# Patient Record
Sex: MALE | Race: WHITE | Employment: OTHER | URBAN - NONMETROPOLITAN AREA
[De-identification: names, ages, dates, MRNs, and addresses within clinical notes are randomized per-mention and may not be internally consistent; named-entity substitution may affect disease eponyms.]

---

## 2017-03-22 ENCOUNTER — OFFICE VISIT (OUTPATIENT)
Dept: CARDIOLOGY | Age: 67
End: 2017-03-22

## 2017-03-22 VITALS
HEART RATE: 56 BPM | WEIGHT: 201 LBS | DIASTOLIC BLOOD PRESSURE: 70 MMHG | BODY MASS INDEX: 28.77 KG/M2 | SYSTOLIC BLOOD PRESSURE: 122 MMHG | HEIGHT: 70 IN

## 2017-03-22 DIAGNOSIS — Z82.49 FAMILY HISTORY OF EARLY CAD: ICD-10-CM

## 2017-03-22 DIAGNOSIS — Z95.5 PRESENCE OF STENT IN LAD CORONARY ARTERY: Primary | ICD-10-CM

## 2017-03-22 PROCEDURE — 3017F COLORECTAL CA SCREEN DOC REV: CPT | Performed by: INTERNAL MEDICINE

## 2017-03-22 PROCEDURE — G8420 CALC BMI NORM PARAMETERS: HCPCS | Performed by: INTERNAL MEDICINE

## 2017-03-22 PROCEDURE — G8484 FLU IMMUNIZE NO ADMIN: HCPCS | Performed by: INTERNAL MEDICINE

## 2017-03-22 PROCEDURE — 4040F PNEUMOC VAC/ADMIN/RCVD: CPT | Performed by: INTERNAL MEDICINE

## 2017-03-22 PROCEDURE — 1036F TOBACCO NON-USER: CPT | Performed by: INTERNAL MEDICINE

## 2017-03-22 PROCEDURE — 99213 OFFICE O/P EST LOW 20 MIN: CPT | Performed by: INTERNAL MEDICINE

## 2017-03-22 PROCEDURE — 1123F ACP DISCUSS/DSCN MKR DOCD: CPT | Performed by: INTERNAL MEDICINE

## 2017-03-22 PROCEDURE — G8427 DOCREV CUR MEDS BY ELIG CLIN: HCPCS | Performed by: INTERNAL MEDICINE

## 2017-03-22 PROCEDURE — G8598 ASA/ANTIPLAT THER USED: HCPCS | Performed by: INTERNAL MEDICINE

## 2017-03-22 RX ORDER — SIMVASTATIN 80 MG
TABLET ORAL
Qty: 90 TABLET | Refills: 2 | Status: SHIPPED | OUTPATIENT
Start: 2017-03-22 | End: 2018-04-09 | Stop reason: SDUPTHER

## 2017-08-07 ENCOUNTER — TELEPHONE (OUTPATIENT)
Dept: UROLOGY | Age: 67
End: 2017-08-07

## 2017-08-07 DIAGNOSIS — R31.29 MICROSCOPIC HEMATURIA: Primary | ICD-10-CM

## 2017-08-10 LAB
APPEARANCE: CLEAR
BACTERIA: NEGATIVE PER HPF
BILIRUBIN: NEGATIVE
COLOR: YELLOW
CYTOLOGY-NON GYN: NORMAL
EPITHELIAL CELLS: 0 PER HPF
GLUCOSE BLD-MCNC: NEGATIVE MG/DL
KETONES, URINE: NEGATIVE
LEUKOCYTE ESTERASE, URINE: NEGATIVE
NITRITE, URINE: NEGATIVE
OCCULT BLOOD,URINE: NEGATIVE
PH: 6.5 (ref 5–9)
PROTEIN, URINE: NEGATIVE
RBC: 0 PER HPF (ref 0–5)
SP GRAVITY MISCELLANEOUS: 1.01 (ref 1–1.03)
UROBILINOGEN, URINE: NORMAL
WBC: 0 PER HPF (ref 0–5)

## 2017-09-13 ENCOUNTER — TELEPHONE (OUTPATIENT)
Dept: ADMINISTRATIVE | Age: 67
End: 2017-09-13

## 2018-04-09 ENCOUNTER — OFFICE VISIT (OUTPATIENT)
Dept: CARDIOLOGY CLINIC | Age: 68
End: 2018-04-09
Payer: MEDICARE

## 2018-04-09 VITALS
SYSTOLIC BLOOD PRESSURE: 134 MMHG | DIASTOLIC BLOOD PRESSURE: 72 MMHG | HEART RATE: 54 BPM | WEIGHT: 199 LBS | BODY MASS INDEX: 28.49 KG/M2 | HEIGHT: 70 IN

## 2018-04-09 DIAGNOSIS — E78.01 FAMILIAL HYPERCHOLESTEROLEMIA: ICD-10-CM

## 2018-04-09 DIAGNOSIS — I25.10 ASHD (ARTERIOSCLEROTIC HEART DISEASE): Primary | ICD-10-CM

## 2018-04-09 DIAGNOSIS — I10 ESSENTIAL HYPERTENSION: ICD-10-CM

## 2018-04-09 PROCEDURE — 93000 ELECTROCARDIOGRAM COMPLETE: CPT | Performed by: NUCLEAR MEDICINE

## 2018-04-09 PROCEDURE — 99213 OFFICE O/P EST LOW 20 MIN: CPT | Performed by: NUCLEAR MEDICINE

## 2018-04-09 RX ORDER — SIMVASTATIN 80 MG
TABLET ORAL
Qty: 90 TABLET | Refills: 2 | Status: SHIPPED | OUTPATIENT
Start: 2018-04-09 | End: 2019-04-08 | Stop reason: SDUPTHER

## 2019-04-08 ENCOUNTER — OFFICE VISIT (OUTPATIENT)
Dept: CARDIOLOGY CLINIC | Age: 69
End: 2019-04-08
Payer: MEDICARE

## 2019-04-08 VITALS
HEIGHT: 70 IN | DIASTOLIC BLOOD PRESSURE: 68 MMHG | SYSTOLIC BLOOD PRESSURE: 126 MMHG | HEART RATE: 55 BPM | WEIGHT: 201 LBS | BODY MASS INDEX: 28.77 KG/M2

## 2019-04-08 DIAGNOSIS — I10 ESSENTIAL HYPERTENSION: ICD-10-CM

## 2019-04-08 DIAGNOSIS — E78.01 FAMILIAL HYPERCHOLESTEROLEMIA: ICD-10-CM

## 2019-04-08 DIAGNOSIS — R06.09 DYSPNEA ON EXERTION: ICD-10-CM

## 2019-04-08 DIAGNOSIS — I25.10 ASHD (ARTERIOSCLEROTIC HEART DISEASE): Primary | ICD-10-CM

## 2019-04-08 PROCEDURE — 99213 OFFICE O/P EST LOW 20 MIN: CPT | Performed by: NUCLEAR MEDICINE

## 2019-04-08 PROCEDURE — 93000 ELECTROCARDIOGRAM COMPLETE: CPT | Performed by: NUCLEAR MEDICINE

## 2019-04-08 RX ORDER — OMEGA-3/DHA/EPA/FISH OIL 60 MG-90MG
CAPSULE ORAL
COMMUNITY
End: 2020-07-27

## 2019-04-08 RX ORDER — SIMVASTATIN 80 MG
TABLET ORAL
Qty: 45 TABLET | Refills: 3 | Status: SHIPPED | OUTPATIENT
Start: 2019-04-08 | End: 2019-06-24 | Stop reason: SDUPTHER

## 2019-04-08 NOTE — PROGRESS NOTES
[address] [facility] CARDIOLOGY  [facility]  [address] [address]  Dept: [phone]  Dept Fax: [phone]  Loc: [phone]    Visit Date: 4/8/2019    Shade Later is a 68 y.o. male who presents todayfor:  Chief Complaint   Patient presents with    Check-Up    Coronary Artery Disease    Hypertension    Hyperlipidemia     Known LAD stent   2013  No chest pain per se  Drives a school bus  Some dyspnea at times  Some fatigue   Higher risk patient  BP is stable  No dizziness  No syncope      HPI:  HPI  Past Medical History:   Diagnosis Date    Arthritis     ASHD (arteriosclerotic heart disease) 1/21/2013    Eructation 8/14/2014    GERD (gastroesophageal reflux disease)     Hyperlipidemia     Splenomegaly 9/15/2014      Past Surgical History:   Procedure Laterality Date    APPENDECTOMY  09/22/2016    Dr. Katie Davenport  07/26/2016    Lamar Riddle  2013    FINGER SURGERY       Family History   Problem Relation Age of Onset    Heart Disease Father 50        MI    Stroke Father     Heart Disease Brother 61        STENTS    Arthritis Sister      Social History     Tobacco Use    Smoking status: Never Smoker    Smokeless tobacco: Never Used   Substance Use Topics    Alcohol use: Yes     Alcohol/week: 0.6 oz     Types: 1 Glasses of wine per week     Comment: socially      Current Outpatient Medications   Medication Sig Dispense Refill    Omega-3 Fatty Acids (FISH OIL) 500 MG CAPS Take by mouth      simvastatin (ZOCOR) 80 MG tablet TAKE 1/2 TABLET BY MOUTH NIGHTLY 90 tablet 2    NITROSTAT 0.4 MG SL tablet DISSOLVE 1 TAB UNDER TONGUE EVERY 5 MINUTES AS NEEDED FOR CHEST PAIN 25 tablet 3    Omeprazole 20 MG TBEC Take 20 mg by mouth 2 times daily (before meals). 30 min. before breakfast  & 60 min. before supper. 180 tablet 3    aspirin 81 MG tablet Take 81 mg by mouth daily. Take 2 tablets daily. No current facility-administered medications for this visit. No Known Allergies  Health Maintenance   Topic Date Due    Hepatitis C screen  1950    DTaP/Tdap/Td vaccine (1 - Tdap) 03/15/1967    Shingles Vaccine (1 of 2) 03/15/1998    Colon cancer screen colonoscopy  03/15/1998    Pneumococcal 65+ years Vaccine (1 of 2 - PCV13) 03/15/2013    Lipid screen  01/07/2018    Flu vaccine (Season Ended) 09/01/2019       Subjective:  Review of Systems  General:   No fever, no chills, No fatigue or weight loss  Pulmonary:    No dyspnea, no wheezing  Cardiac:    Denies recent chest pain,   GI:     No nausea or vomiting, no abdominal pain  Neuro:    No dizziness or light headedness,   Musculoskeletal:  No recent active issues  Extremities:   No edema, good peripheral pulses      Objective:  Physical Exam  /68   Pulse 55   Ht 5' 10\" (1.778 m)   Wt 201 lb (91.2 kg)   BMI 28.84 kg/m²   General:   Well developed, well nourished  Lungs:   Clear to auscultation  Heart:    Normal S1 S2, Slight murmur. no rubs, no gallops  Abdomen:   Soft, non tender, no organomegalies, positive bowel sounds  Extremities:   No edema, no cyanosis, good peripheral pulses  Neurological:   Awake, alert, oriented. No obvious focal deficits  Musculoskelatal:  No obvious deformities    Assessment:      Diagnosis Orders   1. ASHD (arteriosclerotic heart disease)  EKG 12 lead   2. Essential hypertension  EKG 12 lead   3. 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 I reviewed the ECG. No acute findings    Plan:  No follow-ups on file. As above  Higher risk   Non invasive cardiac testing will be ordered to further evaluate for any ischemic or structural heart disease as a cause of the patient symptoms. We will proceed with a Stress Cardiolite test and echo soon.   Continue risk factor modification and medical management  Thank you for allowing me to participate in the care of your patient. Please don't hesitate to contact me regarding any further issues related to the patient care    Orders Placed:  Orders Placed This Encounter   Procedures    EKG 12 lead     Order Specific Question:   Reason for Exam?     Answer: Other       Medications Prescribed:  No orders of the defined types were placed in this encounter. Discussed use, benefit, and side effects of prescribed medications. All patient questions answered. Pt voicedunderstanding. Instructed to continue current medications, diet and exercise. Continue risk factor modification and medical management. Patient agreed with treatment plan. Follow up as directed.     Electronically signedby Usha Francisco MD on 4/8/2019 at 9:47 AM

## 2019-04-23 ENCOUNTER — HOSPITAL ENCOUNTER (OUTPATIENT)
Dept: NON INVASIVE DIAGNOSTICS | Age: 69
Discharge: HOME | End: 2019-04-23
Payer: MEDICARE

## 2019-04-23 ENCOUNTER — TELEPHONE (OUTPATIENT)
Dept: CARDIOLOGY CLINIC | Age: 69
End: 2019-04-23

## 2019-04-23 DIAGNOSIS — I25.10 ASHD (ARTERIOSCLEROTIC HEART DISEASE): ICD-10-CM

## 2019-04-23 DIAGNOSIS — R06.09 DYSPNEA ON EXERTION: ICD-10-CM

## 2019-04-23 LAB
LV EF: 44 %
LV EF: 60 %
LVEF MODALITY: NORMAL
LVEF MODALITY: NORMAL

## 2019-04-23 PROCEDURE — 93017 CV STRESS TEST TRACING ONLY: CPT

## 2019-04-23 PROCEDURE — A9500 TC99M SESTAMIBI: HCPCS | Performed by: NUCLEAR MEDICINE

## 2019-04-23 PROCEDURE — 93306 TTE W/DOPPLER COMPLETE: CPT

## 2019-04-23 PROCEDURE — 2709999900 HC NON-CHARGEABLE SUPPLY

## 2019-04-23 PROCEDURE — 3430000000 HC RX DIAGNOSTIC RADIOPHARMACEUTICAL: Performed by: NUCLEAR MEDICINE

## 2019-04-23 PROCEDURE — 78452 HT MUSCLE IMAGE SPECT MULT: CPT

## 2019-04-23 RX ADMIN — Medication 9.2 MILLICURIE: at 09:45

## 2019-04-23 RX ADMIN — Medication 34 MILLICURIE: at 10:49

## 2019-04-23 NOTE — LETTER
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 If you have any questions or concerns, please don't hesitate to call.     Sincerely,        Ev Hernández MD

## 2019-04-23 NOTE — TELEPHONE ENCOUNTER
Patient had Stress test and echo done today. 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

## 2019-04-24 ENCOUNTER — TELEPHONE (OUTPATIENT)
Dept: CARDIOLOGY CLINIC | Age: 69
End: 2019-04-24

## 2019-06-24 RX ORDER — SIMVASTATIN 80 MG
TABLET ORAL
Qty: 45 TABLET | Refills: 3 | Status: SHIPPED | OUTPATIENT
Start: 2019-06-24 | End: 2019-09-23 | Stop reason: SDUPTHER

## 2019-09-23 RX ORDER — SIMVASTATIN 80 MG
TABLET ORAL
Qty: 45 TABLET | Refills: 3 | Status: SHIPPED | OUTPATIENT
Start: 2019-09-23 | End: 2020-07-27 | Stop reason: SDUPTHER

## 2019-09-23 NOTE — TELEPHONE ENCOUNTER
Rupert Mann called requesting a refill on the following medications:  Requested Prescriptions     Pending Prescriptions Disp Refills    simvastatin (ZOCOR) 80 MG tablet 45 tablet 3     Pharmacy verified: [facility] p[facility]      Date of last visit: 4/8/19  Date of next visit (if applicable): 5/3/5407      90 Day supply

## 2020-07-27 ENCOUNTER — TELEPHONE (OUTPATIENT)
Dept: CARDIOLOGY CLINIC | Age: 70
End: 2020-07-27

## 2020-07-27 ENCOUNTER — OFFICE VISIT (OUTPATIENT)
Dept: CARDIOLOGY CLINIC | Age: 70
End: 2020-07-27
Payer: MEDICARE

## 2020-07-27 VITALS
DIASTOLIC BLOOD PRESSURE: 62 MMHG | HEIGHT: 70 IN | SYSTOLIC BLOOD PRESSURE: 136 MMHG | HEART RATE: 46 BPM | BODY MASS INDEX: 28.46 KG/M2 | WEIGHT: 198.8 LBS

## 2020-07-27 PROCEDURE — 93000 ELECTROCARDIOGRAM COMPLETE: CPT | Performed by: NUCLEAR MEDICINE

## 2020-07-27 PROCEDURE — 99213 OFFICE O/P EST LOW 20 MIN: CPT | Performed by: NUCLEAR MEDICINE

## 2020-07-27 RX ORDER — HYDROCHLOROTHIAZIDE 25 MG/1
25 TABLET ORAL DAILY
Qty: 90 TABLET | Refills: 3 | Status: SHIPPED | OUTPATIENT
Start: 2020-07-27 | End: 2020-07-27 | Stop reason: SDUPTHER

## 2020-07-27 RX ORDER — SIMVASTATIN 40 MG
40 TABLET ORAL NIGHTLY
Qty: 90 TABLET | Refills: 3 | Status: SHIPPED | OUTPATIENT
Start: 2020-07-27 | End: 2020-07-27 | Stop reason: SDUPTHER

## 2020-07-27 RX ORDER — HYDROCHLOROTHIAZIDE 25 MG/1
25 TABLET ORAL DAILY
COMMUNITY
End: 2020-07-27 | Stop reason: SDUPTHER

## 2020-07-27 RX ORDER — NITROGLYCERIN 0.4 MG/1
TABLET SUBLINGUAL
Qty: 25 TABLET | Refills: 3 | Status: CANCELLED | OUTPATIENT
Start: 2020-07-27

## 2020-07-27 RX ORDER — NITROGLYCERIN 0.4 MG/1
TABLET SUBLINGUAL
Qty: 25 TABLET | Refills: 3 | Status: SHIPPED | OUTPATIENT
Start: 2020-07-27 | End: 2020-07-30 | Stop reason: SDUPTHER

## 2020-07-27 RX ORDER — SIMVASTATIN 40 MG
40 TABLET ORAL NIGHTLY
Qty: 90 TABLET | Refills: 3 | Status: SHIPPED | OUTPATIENT
Start: 2020-07-27 | End: 2021-04-19 | Stop reason: SDUPTHER

## 2020-07-27 RX ORDER — HYDROCHLOROTHIAZIDE 25 MG/1
25 TABLET ORAL DAILY
Qty: 90 TABLET | Refills: 3 | Status: SHIPPED | OUTPATIENT
Start: 2020-07-27 | End: 2021-04-19

## 2020-07-27 NOTE — TELEPHONE ENCOUNTER
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 Scripts are in Dr. Dennys Cárdenas box.

## 2020-07-27 NOTE — PROGRESS NOTES
[address] [id number]  Dept: [phone]  Dept Fax: [phone]  Loc: [phone]    Visit Date: 7/27/2020    Zeyad Al is a 65 y.o. male who presents todayfor:  Chief Complaint   Patient presents with    1 Year Follow Up    Coronary Artery Disease    Hypertension    Hyperlipidemia     Known LAD stent done 2013   No chest pain  some changes in breathing  Some more dyspnea than usual   More exertional along with wheezing   No new symptoms  No dizziness  No syncope  Slower HR       HPI:  HPI  Past Medical History:   Diagnosis Date    Arthritis     ASHD (arteriosclerotic heart disease) 1/21/2013    Eructation 8/14/2014    GERD (gastroesophageal reflux disease)     Hyperlipidemia     Splenomegaly 9/15/2014      Past Surgical History:   Procedure Laterality Date    APPENDECTOMY  09/22/2016    Dr. Mcqueen Bud COLONOSCOPY  07/26/2016    Vandana Riddle  2013    FINGER SURGERY       Family History   Problem Relation Age of Onset    Heart Disease Father 50        MI    Stroke Father     Heart Disease Brother 61        STENTS    Arthritis Sister      Social History     Tobacco Use    Smoking status: Never Smoker    Smokeless tobacco: Never Used   Substance Use Topics    Alcohol use: Yes     Alcohol/week: 1.0 standard drinks     Types: 1 Glasses of wine per week     Comment: socially      Current Outpatient Medications   Medication Sig Dispense Refill    simvastatin (ZOCOR) 80 MG tablet 0.5 tab daily (Patient taking differently: Take 40 mg by mouth nightly ) 45 tablet 3    NITROSTAT 0.4 MG SL tablet DISSOLVE 1 TAB UNDER TONGUE EVERY 5 MINUTES AS NEEDED FOR CHEST PAIN 25 tablet 3    Omeprazole 20 MG TBEC Take 20 mg by mouth 2 times daily (before meals). 30 min. before breakfast  & 60 min. before supper.  180 tablet 3    aspirin 81 MG tablet Take 81 mg by mouth daily. Take 2 tablets daily. No current facility-administered medications for this visit. No Known Allergies  Health Maintenance   Topic Date Due    Hepatitis C screen  1950    DTaP/Tdap/Td vaccine (1 - Tdap) 03/15/1967    Shingles Vaccine (1 of 2) 03/15/1998    Colon cancer screen colonoscopy  03/15/1998    Pneumococcal 65+ years Vaccine (1 of 1 - PPSV23) 03/15/2013    Lipid screen  01/07/2014    Annual Wellness Visit (AWV)  06/23/2019    Flu vaccine (1) 09/01/2020    Hepatitis A vaccine  Aged Out    Hepatitis B vaccine  Aged Out    Hib vaccine  Aged Out    Meningococcal (ACWY) vaccine  Aged Out       Subjective:  Review of Systems  General:   No fever, no chills, No fatigue or weight loss  Pulmonary:    No dyspnea, no wheezing  Cardiac:    Denies recent chest pain,   GI:     No nausea or vomiting, no abdominal pain  Neuro:    No dizziness or light headedness,   Musculoskeletal:  No recent active issues  Extremities:   No edema, no obvious claudication       Objective:  Physical Exam  /62   Pulse (!) 46   Ht 5' 10\" (1.778 m)   Wt 198 lb 12.8 oz (90.2 kg)   BMI 28.52 kg/m²   General:   Well developed, well nourished  Lungs:   Clear to auscultation  Heart:    Normal S1 S2, Slight murmur. no rubs, no gallops  Abdomen:   Soft, non tender, no organomegalies, positive bowel sounds  Extremities:   No edema, no cyanosis, good peripheral pulses  Neurological:   Awake, alert, oriented. No obvious focal deficits  Musculoskelatal:  No obvious deformities    Assessment:      Diagnosis Orders   1. SOB (shortness of breath) on exertion  EKG 12 lead   2. ASHD (arteriosclerotic heart disease)  EKG 12 lead   3. Coronary artery disease involving native coronary artery of native heart without angina pectoris     cardiac fair for now   ? ? Asthma or COPD  ECG in office was done today. I reviewed the ECG. No acute findings      Plan:  No follow-ups on file.   Monitor   Consider low dose diuretics Consider more testing   Continue risk factor modification and medical management  Thank you for allowing me to participate in the care of your patient. Please don't hesitate to contact me regarding any further issues related to the patient care    Orders Placed:  Orders Placed This Encounter   Procedures    EKG 12 lead     Order Specific Question:   Reason for Exam?     Answer: Other       Medications Prescribed:  No orders of the defined types were placed in this encounter. Discussed use, benefit, and side effects of prescribed medications. All patient questions answered. Pt voicedunderstanding. Instructed to continue current medications, diet and exercise. Continue risk factor modification and medical management. Patient agreed with treatment plan. Follow up as directed.     Electronically signedby Patsy Richardson MD on 7/27/2020 at 8:44 AM

## 2020-07-27 NOTE — LETTER
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 If you have any questions or concerns, please don't hesitate to call.     Sincerely,        Jignesh Jaimes MD

## 2020-07-28 LAB
ABSOLUTE BASO #: 0 X10E9/L (ref 0–0.2)
ABSOLUTE EOS #: 0.2 X10E9/L (ref 0–0.4)
ABSOLUTE LYMPH #: 2.1 X10E9/L (ref 1–3.5)
ABSOLUTE MONO #: 0.3 X10E9/L (ref 0–0.9)
ABSOLUTE NEUT #: 2.5 X10E9/L (ref 1.5–6.6)
ALBUMIN SERPL-MCNC: 4 G/DL (ref 3.2–5.3)
ALK PHOSPHATASE: 52 U/L (ref 39–130)
ALT SERPL-CCNC: 10 U/L (ref 0–40)
ANION GAP SERPL CALCULATED.3IONS-SCNC: 8 MMOL/L (ref 5–15)
AST SERPL-CCNC: 14 U/L (ref 0–41)
BASOPHILS RELATIVE PERCENT: 0.6 %
BILIRUB SERPL-MCNC: 0.5 MG/DL (ref 0.3–1.2)
BILIRUBIN DIRECT: 0.1 MG/DL (ref 0–0.4)
BUN BLDV-MCNC: 18 MG/DL (ref 5–27)
CALCIUM SERPL-MCNC: 9 MG/DL (ref 8.5–10.5)
CHLORIDE BLD-SCNC: 106 MMOL/L (ref 98–109)
CHOLESTEROL/HDL RATIO: 3.9 (ref 1–5)
CHOLESTEROL: 149 MG/DL (ref 150–200)
CO2: 30 MMOL/L (ref 22–32)
CREAT SERPL-MCNC: 1.15 MG/DL (ref 0.6–1.3)
EGFR AFRICAN AMERICAN: >60 ML/MIN/1.73SQ.M
EGFR IF NONAFRICAN AMERICAN: >60 ML/MIN/1.73SQ.M
EOSINOPHILS RELATIVE PERCENT: 3 %
GLUCOSE: 87 MG/DL (ref 65–99)
HCT VFR BLD CALC: 40.4 % (ref 39–49)
HDLC SERPL-MCNC: 38 MG/DL
HEMOGLOBIN: 13.5 G/DL (ref 13–17)
LDL CHOLESTEROL CALCULATED: 100 MG/DL
LDL/HDL RATIO: 2.6
LYMPHOCYTE %: 41.8 %
MCH RBC QN AUTO: 28.1 PG (ref 27–34)
MCHC RBC AUTO-ENTMCNC: 33.4 G/DL (ref 32–36)
MCV RBC AUTO: 84 FL (ref 80–100)
MONOCYTES # BLD: 5.7 %
NEUTROPHILS RELATIVE PERCENT: 48.9 %
PDW BLD-RTO: 13.8 % (ref 11.5–15)
PLATELETS: 125 X10E9/L (ref 150–450)
PMV BLD AUTO: 8.4 FL (ref 7–12)
POTASSIUM SERPL-SCNC: 4.2 MMOL/L (ref 3.5–5)
RBC: 4.8 X10E12/L (ref 4.1–5.7)
SODIUM BLD-SCNC: 144 MMOL/L (ref 134–146)
TOTAL PROTEIN: 6.5 G/DL (ref 6–8)
TRIGL SERPL-MCNC: 54 MG/DL (ref 27–150)
VLDLC SERPL CALC-MCNC: 11 MG/DL (ref 0–30)
WBC: 5.1 X10E9/L (ref 4–11)

## 2020-07-28 NOTE — TELEPHONE ENCOUNTER
Talked to [facility] and she is aware their daughter in law Sudha Yanes picked up letter. They state they are also wanting last OV, stress and echo reports. Information printed and out at  of office. Sudha Yanes will  today.

## 2020-07-30 RX ORDER — NITROGLYCERIN 0.4 MG/1
TABLET SUBLINGUAL
Qty: 25 TABLET | Refills: 1 | Status: SHIPPED | OUTPATIENT
Start: 2020-07-30 | End: 2022-04-25 | Stop reason: SDUPTHER

## 2021-04-19 ENCOUNTER — OFFICE VISIT (OUTPATIENT)
Dept: CARDIOLOGY CLINIC | Age: 71
End: 2021-04-19
Payer: MEDICARE

## 2021-04-19 VITALS
HEIGHT: 70 IN | WEIGHT: 194 LBS | HEART RATE: 60 BPM | BODY MASS INDEX: 27.77 KG/M2 | SYSTOLIC BLOOD PRESSURE: 131 MMHG | DIASTOLIC BLOOD PRESSURE: 66 MMHG

## 2021-04-19 DIAGNOSIS — I25.10 CORONARY ARTERY DISEASE INVOLVING NATIVE CORONARY ARTERY OF NATIVE HEART WITHOUT ANGINA PECTORIS: Primary | ICD-10-CM

## 2021-04-19 DIAGNOSIS — I10 ESSENTIAL HYPERTENSION: ICD-10-CM

## 2021-04-19 DIAGNOSIS — E78.01 FAMILIAL HYPERCHOLESTEROLEMIA: ICD-10-CM

## 2021-04-19 DIAGNOSIS — Z01.818 PRE-OP EXAM: ICD-10-CM

## 2021-04-19 PROCEDURE — 99214 OFFICE O/P EST MOD 30 MIN: CPT | Performed by: NUCLEAR MEDICINE

## 2021-04-19 PROCEDURE — 93000 ELECTROCARDIOGRAM COMPLETE: CPT | Performed by: NUCLEAR MEDICINE

## 2021-04-19 RX ORDER — SIMVASTATIN 40 MG
40 TABLET ORAL NIGHTLY
Qty: 90 TABLET | Refills: 3 | Status: SHIPPED | OUTPATIENT
Start: 2021-04-19 | End: 2022-04-25 | Stop reason: SDUPTHER

## 2021-04-19 NOTE — PROGRESS NOTES
[facility] [address] [place] [address] [address].  [address]  [facility] [address]  Dept: [phone]  Dept Fax: [phone]  Loc: [phone]    Visit Date: 4/19/2021    Davian Cox is a 66 y.o. male who presents todayfor:  Chief Complaint   Patient presents with    1 Year Follow Up    Coronary Artery Disease    Hypertension    Hyperlipidemia   some chest tightness with exertion in the last several months  Some dyspnea with exertion   No use of nitro   known LAD stent 2013  He is a school bud   Does have HTN off of  medical RX, life style controlled   No dizziness  No syncope  On statins for hyperlipidemia  No issues there       HPI:  HPI  Past Medical History:   Diagnosis Date    Arthritis     ASHD (arteriosclerotic heart disease) 1/21/2013    Eructation 8/14/2014    GERD (gastroesophageal reflux disease)     Hyperlipidemia     Splenomegaly 9/15/2014      Past Surgical History:   Procedure Laterality Date    APPENDECTOMY  09/22/2016    Dr. Maksim Gil  07/26/2016    Yadiel Riddle  2013    FINGER SURGERY       Family History   Problem Relation Age of Onset    Heart Disease Father 50        MI    Stroke Father     Heart Disease Brother 61        STENTS    Arthritis Sister      Social History     Tobacco Use    Smoking status: Never Smoker    Smokeless tobacco: Never Used   Substance Use Topics    Alcohol use: Yes     Alcohol/week: 1.0 standard drinks     Types: 1 Glasses of wine per week     Comment: socially      Current Outpatient Medications   Medication Sig Dispense Refill    nitroGLYCERIN (NITROSTAT) 0.4 MG SL tablet DISSOLVE 1 TAB UNDER TONGUE EVERY 5 MINUTES AS NEEDED FOR CHEST PAIN.  After third dose and still no relief, call 911 25 tablet 1    simvastatin (ZOCOR) 40 MG tablet Take 1 tablet by mouth nightly 90 tablet 3    Omeprazole 20 MG TBEC Take 20 mg by mouth 2 times daily (before meals). 30 min. before breakfast  & 60 min. before supper. 180 tablet 3    aspirin 81 MG tablet Take 81 mg by mouth daily. Take 2 tablets daily. No current facility-administered medications for this visit. No Known Allergies  Health Maintenance   Topic Date Due    Hepatitis C screen  Never done    COVID-19 Vaccine (1) Never done    DTaP/Tdap/Td vaccine (1 - Tdap) Never done    Shingles Vaccine (1 of 2) Never done    Colon cancer screen colonoscopy  Never done    Pneumococcal 65+ years Vaccine (1 of 1 - PPSV23) Never done   [employer] Visit (AWV)  Never done    Lipid screen  07/27/2021    Potassium monitoring  07/27/2021    Creatinine monitoring  07/27/2021    Flu vaccine (Season Ended) 09/01/2021    Hepatitis A vaccine  Aged Out    Hepatitis B vaccine  Aged Out    Hib vaccine  Aged Out    Meningococcal (ACWY) vaccine  Aged Out       Subjective:  Review of Systems  General:   No fever, no chills, some fatigue or weight loss  Pulmonary:    some dyspnea, no wheezing  Cardiac:    Did have  recent chest pain,   GI:     No nausea or vomiting, no abdominal pain  Neuro:    No dizziness or light headedness,   Musculoskeletal:  No recent active issues  Extremities:   No edema, no obvious claudication       Objective:  Physical Exam  /66   Pulse 60   Ht 5' 10\" (1.778 m)   Wt 194 lb (88 kg)   BMI 27.84 kg/m²   General:   Well developed, well nourished  Lungs:   Clear to auscultation  Heart:    Normal S1 S2, Slight murmur. no rubs, no gallops  Abdomen:   Soft, non tender, no organomegalies, positive bowel sounds  Extremities:   No edema, no cyanosis, good peripheral pulses  Neurological:   Awake, alert, oriented. No obvious focal deficits  Musculoskelatal:  No obvious deformities    Assessment:      Diagnosis Orders   1. Coronary artery disease involving native coronary artery of native heart without angina pectoris  EKG 12 Lead   2. Essential hypertension     3.  Familial hypercholesterolemia     as above  Higher risk patient   No recent blood work   ECG in office was done today. I reviewed the ECG. No acute findings      Plan:  No follow-ups on file. As above  Non invasive cardiac testing will be ordered to further evaluate for any ischemic or structural heart disease as a cause of the patient symptoms. We will proceed with a Stress Cardiolite test and echo soon. Blood work   Continue risk factor modification and medical management  Thank you for allowing me to participate in the care of your patient. Please don't hesitate to contact me regarding any further issues related to the patient care    Orders Placed:  Orders Placed This Encounter   Procedures    EKG 12 Lead     Order Specific Question:   Reason for Exam?     Answer: Other       Medications Prescribed:  No orders of the defined types were placed in this encounter. Discussed use, benefit, and side effects of prescribed medications. All patient questions answered. Pt voicedunderstanding. Instructed to continue current medications, diet and exercise. Continue risk factor modification and medical management. Patient agreed with treatment plan. Follow up as directed.     Electronically signedby Zeinab Lu MD on 4/19/2021 at 7:45 AM

## 2021-04-19 NOTE — PROGRESS NOTES
Pt C/O   Pt has no heart related complaints at this time.      Pt denies CP, SOB, Headache, dizziness, heart palpitations, swelling, fatigue

## 2021-04-21 ENCOUNTER — HOSPITAL ENCOUNTER (OUTPATIENT)
Dept: NON INVASIVE DIAGNOSTICS | Age: 71
Discharge: HOME | End: 2021-04-21
Payer: MEDICARE

## 2021-04-21 ENCOUNTER — TELEPHONE (OUTPATIENT)
Dept: CARDIOLOGY CLINIC | Age: 71
End: 2021-04-21

## 2021-04-21 VITALS — WEIGHT: 190 LBS | BODY MASS INDEX: 28.14 KG/M2 | HEIGHT: 69 IN

## 2021-04-21 DIAGNOSIS — E78.01 FAMILIAL HYPERCHOLESTEROLEMIA: ICD-10-CM

## 2021-04-21 DIAGNOSIS — Z01.818 PRE-OP EXAM: ICD-10-CM

## 2021-04-21 DIAGNOSIS — I25.10 CORONARY ARTERY DISEASE INVOLVING NATIVE CORONARY ARTERY OF NATIVE HEART WITHOUT ANGINA PECTORIS: ICD-10-CM

## 2021-04-21 DIAGNOSIS — I10 ESSENTIAL HYPERTENSION: ICD-10-CM

## 2021-04-21 LAB
LV EF: 46 %
LV EF: 55 %
LVEF MODALITY: NORMAL
LVEF MODALITY: NORMAL

## 2021-04-21 PROCEDURE — 93017 CV STRESS TEST TRACING ONLY: CPT | Performed by: NUCLEAR MEDICINE

## 2021-04-21 PROCEDURE — 93306 TTE W/DOPPLER COMPLETE: CPT

## 2021-04-21 PROCEDURE — 78452 HT MUSCLE IMAGE SPECT MULT: CPT

## 2021-04-21 PROCEDURE — 3430000000 HC RX DIAGNOSTIC RADIOPHARMACEUTICAL: Performed by: NUCLEAR MEDICINE

## 2021-04-21 PROCEDURE — A9500 TC99M SESTAMIBI: HCPCS | Performed by: NUCLEAR MEDICINE

## 2021-04-21 RX ADMIN — Medication 32 MILLICURIE: at 11:41

## 2021-04-21 RX ADMIN — Medication 8.6 MILLICURIE: at 10:45

## 2021-04-22 ENCOUNTER — TELEPHONE (OUTPATIENT)
Dept: CARDIOLOGY CLINIC | Age: 71
End: 2021-04-22

## 2021-04-22 DIAGNOSIS — R94.39 ABNORMAL STRESS TEST: Primary | ICD-10-CM

## 2021-04-22 NOTE — TELEPHONE ENCOUNTER
Spoke to pt and his wife, he states he does have some on and off cp, ok to order cath. Gave order to scheduling. Per pt ok to speak to wife.

## 2021-04-23 ENCOUNTER — HOSPITAL ENCOUNTER (OUTPATIENT)
Age: 71
Discharge: HOME | End: 2021-04-23
Payer: MEDICARE

## 2021-04-24 LAB
ABSOLUTE BASO #: 0 X10E9/L (ref 0–0.2)
ABSOLUTE EOS #: 0.1 X10E9/L (ref 0–0.4)
ABSOLUTE LYMPH #: 2.2 X10E9/L (ref 1–3.5)
ABSOLUTE MONO #: 0.2 X10E9/L (ref 0–0.9)
ABSOLUTE NEUT #: 3 X10E9/L (ref 1.5–6.6)
ALBUMIN SERPL-MCNC: 4.5 G/DL (ref 3.2–5.3)
ALK PHOSPHATASE: 60 U/L (ref 39–130)
ALT SERPL-CCNC: 8 U/L (ref 0–40)
ANION GAP SERPL CALCULATED.3IONS-SCNC: 9 MMOL/L (ref 5–15)
AST SERPL-CCNC: 19 U/L (ref 0–41)
BASOPHILS RELATIVE PERCENT: 0.6 %
BILIRUB SERPL-MCNC: 0.5 MG/DL (ref 0.3–1.2)
BILIRUBIN DIRECT: 0.1 MG/DL (ref 0–0.4)
BUN BLDV-MCNC: 22 MG/DL (ref 5–27)
CALCIUM SERPL-MCNC: 9.2 MG/DL (ref 8.5–10.5)
CHLORIDE BLD-SCNC: 101 MMOL/L (ref 98–109)
CHOLESTEROL/HDL RATIO: 3.4 (ref 1–5)
CHOLESTEROL: 148 MG/DL (ref 150–200)
CO2: 32 MMOL/L (ref 22–32)
CREAT SERPL-MCNC: 1.24 MG/DL (ref 0.6–1.3)
EGFR AFRICAN AMERICAN: >60 ML/MIN/1.73SQ.M
EGFR IF NONAFRICAN AMERICAN: 57 ML/MIN/1.73SQ.M
ENDOMYSIAL AB IGG: NORMAL
ENDOMYSIAL IGA ANTIBODY: NEGATIVE
EOSINOPHILS RELATIVE PERCENT: 2.4 %
FOLATE: 9.5 NG/ML
GLIADIN ANTIBODIES IGA: 8 U/ML
GLIADIN ANTIBODIES IGG: <1 U/ML
GLUCOSE: 102 MG/DL (ref 65–99)
HCT VFR BLD CALC: 38.6 % (ref 39–49)
HCT VFR BLD CALC: 39.1 % (ref 39–49)
HDLC SERPL-MCNC: 44 MG/DL
HEMOGLOBIN: 12.7 G/DL (ref 13–17)
HEMOGLOBIN: 12.8 G/DL (ref 13–17)
IGA: 124 MG/DL (ref 68–378)
LDL CHOLESTEROL CALCULATED: 91 MG/DL
LDL/HDL RATIO: 2.1
LYMPHOCYTE %: 39.2 %
Lab: NORMAL
MCH RBC QN AUTO: 24.7 PG (ref 27–34)
MCH RBC QN AUTO: 25 PG (ref 27–34)
MCHC RBC AUTO-ENTMCNC: 32.6 G/DL (ref 32–36)
MCHC RBC AUTO-ENTMCNC: 32.9 G/DL (ref 32–36)
MCV RBC AUTO: 76 FL (ref 80–100)
MCV RBC AUTO: 76 FL (ref 80–100)
MONOCYTES # BLD: 4.4 %
NEUTROPHILS RELATIVE PERCENT: 53.4 %
PDW BLD-RTO: 15.4 % (ref 11.5–15)
PDW BLD-RTO: 15.6 % (ref 11.5–15)
PLATELETS: 139 X10E9/L (ref 150–450)
PLATELETS: 141 X10E9/L (ref 150–450)
PMV BLD AUTO: 8.3 FL (ref 7–12)
PMV BLD AUTO: 8.4 FL (ref 7–12)
POTASSIUM SERPL-SCNC: 4 MMOL/L (ref 3.5–5)
PSA, ULTRASENSITIVE: 1.53 NG/ML (ref 0–4)
RBC: 5.08 X10E12/L (ref 4.1–5.7)
RBC: 5.15 X10E12/L (ref 4.1–5.7)
SODIUM BLD-SCNC: 142 MMOL/L (ref 134–146)
SOLUBLE TRANSFERRIN RECEPT: 6.9 MG/L (ref 2.2–5)
TISSUE TRANSGLUTAMINASE IGA: <1 U/ML
TOTAL PROTEIN: 7.3 G/DL (ref 6–8)
TRIGL SERPL-MCNC: 63 MG/DL (ref 27–150)
TTG, IGG: 1 U/ML
VITAMIN B-12: 367 PG/ML (ref 180–914)
VLDLC SERPL CALC-MCNC: 13 MG/DL (ref 0–30)
WBC: 5.5 X10E9/L (ref 4–11)
WBC: 5.6 X10E9/L (ref 4–11)

## 2021-05-11 ENCOUNTER — PREP FOR PROCEDURE (OUTPATIENT)
Dept: CARDIOLOGY | Age: 71
End: 2021-05-11

## 2021-05-11 RX ORDER — SODIUM CHLORIDE 0.9 % (FLUSH) 0.9 %
5-40 SYRINGE (ML) INJECTION EVERY 12 HOURS SCHEDULED
Status: CANCELLED | OUTPATIENT
Start: 2021-05-11

## 2021-05-11 RX ORDER — SODIUM CHLORIDE 9 MG/ML
25 INJECTION, SOLUTION INTRAVENOUS PRN
Status: CANCELLED | OUTPATIENT
Start: 2021-05-11

## 2021-05-11 RX ORDER — ASPIRIN 325 MG
325 TABLET ORAL ONCE
Status: CANCELLED | OUTPATIENT
Start: 2021-05-11 | End: 2021-05-11

## 2021-05-11 RX ORDER — SODIUM CHLORIDE 9 MG/ML
50 INJECTION, SOLUTION INTRAVENOUS CONTINUOUS
Status: CANCELLED | OUTPATIENT
Start: 2021-05-11

## 2021-05-11 RX ORDER — NITROGLYCERIN 0.4 MG/1
0.4 TABLET SUBLINGUAL EVERY 5 MIN PRN
Status: CANCELLED | OUTPATIENT
Start: 2021-05-11

## 2021-05-11 RX ORDER — DIPHENHYDRAMINE HYDROCHLORIDE 50 MG/ML
50 INJECTION INTRAMUSCULAR; INTRAVENOUS ONCE
Status: CANCELLED | OUTPATIENT
Start: 2021-05-11 | End: 2021-05-11

## 2021-05-11 RX ORDER — SODIUM CHLORIDE 0.9 % (FLUSH) 0.9 %
5-40 SYRINGE (ML) INJECTION PRN
Status: CANCELLED | OUTPATIENT
Start: 2021-05-11

## 2021-05-12 ENCOUNTER — TELEPHONE (OUTPATIENT)
Dept: CARDIOLOGY CLINIC | Age: 71
End: 2021-05-12

## 2021-05-12 NOTE — TELEPHONE ENCOUNTER
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 Deanna Gutierrez he has a physical coming up and would like to have letter to take to his appointment with him. Said he just had cath done to day and everything was good. Please advise.

## 2021-05-13 ENCOUNTER — FOLLOWUP TELEPHONE ENCOUNTER (OUTPATIENT)
Dept: INPATIENT UNIT | Age: 71
End: 2021-05-13

## 2021-06-07 ENCOUNTER — TELEPHONE (OUTPATIENT)
Dept: CARDIOLOGY CLINIC | Age: 71
End: 2021-06-07

## 2021-06-07 NOTE — TELEPHONE ENCOUNTER
I had heart cath 5/12 and was put on the medication isosorb mono ER 30 mg.     I have had headaches every day starting 6-8 hours after taking. I was told headaches would go away after 2 weeks. They have not. Should I give it more time?      1. I havent been told or nor understand the reason this med is needed.      2. Is there an alternative med that may not cause headaches.     Was taking Tylenol ES for headaches. It did nothing for headaches. I have been using motrin and do get some relief but have history of gastric reflux and severe inflammation so dont want to stay on motrin routinely. Next appt with Dr Rochelle Pham is July 14.   thanks  [facility]

## 2021-06-11 ENCOUNTER — OFFICE VISIT (OUTPATIENT)
Dept: CARDIOLOGY CLINIC | Age: 71
End: 2021-06-11
Payer: MEDICARE

## 2021-06-11 VITALS
DIASTOLIC BLOOD PRESSURE: 60 MMHG | SYSTOLIC BLOOD PRESSURE: 118 MMHG | WEIGHT: 193.6 LBS | BODY MASS INDEX: 28.68 KG/M2 | HEART RATE: 46 BPM | HEIGHT: 69 IN

## 2021-06-11 DIAGNOSIS — I25.10 CORONARY ARTERY DISEASE INVOLVING NATIVE CORONARY ARTERY OF NATIVE HEART WITHOUT ANGINA PECTORIS: Primary | ICD-10-CM

## 2021-06-11 DIAGNOSIS — E78.01 FAMILIAL HYPERCHOLESTEROLEMIA: ICD-10-CM

## 2021-06-11 PROCEDURE — 99204 OFFICE O/P NEW MOD 45 MIN: CPT | Performed by: NUCLEAR MEDICINE

## 2021-06-11 NOTE — PROGRESS NOTES
[address] [facility] [place] [facility] [address]  [address] [place] [address]  Dept: [phone]  Dept Fax: [phone]  Loc: [phone]    Visit Date: 6/11/2021    Pérez Duenas is a 66 y.o. male who presents todayfor:  Chief Complaint   Patient presents with    Follow-up     discuss Imdur    Hypertension    Coronary Artery Disease     Chest pain improved  Cath done   Moderate CAD  Patent stent   Feels better  Some headaches from imdur  No dizziness  No syncope      HPI:  HPI  Past Medical History:   Diagnosis Date    Arthritis     ASHD (arteriosclerotic heart disease) 1/21/2013    Eructation 8/14/2014    GERD (gastroesophageal reflux disease)     Hyperlipidemia     Splenomegaly 9/15/2014      Past Surgical History:   Procedure Laterality Date    APPENDECTOMY  09/22/2016    Dr. Wong Mathews COLONOSCOPY  07/26/2016    Eric Riddle   [address] WITH STENT PLACEMENT  2013    2 stents lad    FINGER SURGERY       Family History   Problem Relation Age of Onset    Heart Disease Father 50        MI    Stroke Father     Heart Disease Brother 61        STENTS    Arthritis Sister     Heart Disease Brother     Heart Disease Brother     No Known Problems Brother     No Known Problems Brother     No Known Problems Brother     No Known Problems Sister     No Known Problems Sister     No Known Problems Sister      Social History     Tobacco Use    Smoking status: Never Smoker    Smokeless tobacco: Never Used   Substance Use Topics    Alcohol use:  Yes     Alcohol/week: 1.0 standard drinks     Types: 1 Glasses of wine per week     Comment: socially      Current Outpatient Medications   Medication Sig Dispense Refill    hydroCHLOROthiazide (HYDRODIURIL) 25 MG tablet Take 25 mg by mouth daily      ibuprofen (ADVIL;MOTRIN) 200 MG tablet Take 200 mg by mouth every 6 hours as needed for Pain      isosorbide mononitrate (IMDUR) 30 MG extended release tablet Take 1 tablet by mouth daily 30 tablet 3    simvastatin (ZOCOR) 40 MG tablet Take 1 tablet by mouth nightly 90 tablet 3    nitroGLYCERIN (NITROSTAT) 0.4 MG SL tablet DISSOLVE 1 TAB UNDER TONGUE EVERY 5 MINUTES AS NEEDED FOR CHEST PAIN. After third dose and still no relief, call 911 25 tablet 1    Omeprazole 20 MG TBEC Take 20 mg by mouth 2 times daily (before meals). 30 min. before breakfast  & 60 min. before supper. (Patient taking differently: Take 20 mg by mouth 2 times daily 30 min. before breakfast  & 60 min. before supper.) 180 tablet 3    aspirin 81 MG tablet Take 81 mg by mouth daily. Take 2 tablets daily. No current facility-administered medications for this visit.      No Known Allergies  Health Maintenance   Topic Date Due    Hepatitis C screen  Never done    COVID-19 Vaccine (1) Never done    DTaP/Tdap/Td vaccine (1 - Tdap) Never done    Shingles Vaccine (1 of 2) Never done    Colon cancer screen colonoscopy  Never done    Pneumococcal 65+ years Vaccine (1 of 1 - PPSV23) Never done   [employer] Visit (AWV)  Never done    Flu vaccine (Season Ended) 09/01/2021    Lipid screen  05/12/2022    Potassium monitoring  05/12/2022    Creatinine monitoring  05/12/2022    Hepatitis A vaccine  Aged Out    Hepatitis B vaccine  Aged Out    Hib vaccine  Aged Out    Meningococcal (ACWY) vaccine  Aged Out       Subjective:  Review of Systems  General:   No fever, no chills, No fatigue or weight loss  Pulmonary:    some dyspnea, no wheezing  Cardiac:    Denies recent chest pain,   GI:     No nausea or vomiting, no abdominal pain  Neuro:    No dizziness or light headedness,   Musculoskeletal:  No recent active issues  Extremities:   No edema, no obvious claudication       Objective:  Physical Exam  /60   Pulse (!) 46   Ht 5' 9\" (1.753 m)   Wt 193 lb 9.6 oz (87.8 kg)   BMI 28.59 kg/m²   General:   Well developed, well nourished  Lungs:   Clear to auscultation  Heart: Normal S1 S2, Slight murmur. no rubs, no gallops  Abdomen:   Soft, non tender, no organomegalies, positive bowel sounds  Extremities:   No edema, no cyanosis, good peripheral pulses  Neurological:   Awake, alert, oriented. No obvious focal deficits  Musculoskelatal:  No obvious deformities    Assessment:      Diagnosis Orders   1. Coronary artery disease involving native coronary artery of native heart without angina pectoris     2. Familial hypercholesterolemia     as above  cardiac seems fair     Plan:  No follow-ups on file. As above  Has many question   I have spent minutes face to face with the patient. More than 50% of this time was spent counseling and coordinating care. Will cut imdur in half or stop it   Echo next year   Continue risk factor modification and medical management  Thank you for allowing me to participate in the care of your patient. Please don't hesitate to contact me regarding any further issues related to the patient care    Orders Placed:  No orders of the defined types were placed in this encounter. Medications Prescribed:  No orders of the defined types were placed in this encounter. Discussed use, benefit, and side effects of prescribed medications. All patient questions answered. Pt voicedunderstanding. Instructed to continue current medications, diet and exercise. Continue risk factor modification and medical management. Patient agreed with treatment plan. Follow up as directed.     Electronically signedby rKistan Camacho MD on 6/11/2021 at 9:04 AM

## 2021-06-11 NOTE — PROGRESS NOTES
Patient would like to discuss Imdur side effects specifically headaches--pt would also like to know why he is taking Imdur.

## 2021-09-01 ENCOUNTER — HOSPITAL ENCOUNTER (OUTPATIENT)
Age: 71
Discharge: HOME | End: 2021-09-01
Payer: MEDICARE

## 2021-09-01 ENCOUNTER — NURSE ONLY (OUTPATIENT)
Dept: LAB | Age: 71
End: 2021-09-01

## 2021-09-01 ENCOUNTER — HOSPITAL ENCOUNTER (OUTPATIENT)
Dept: GENERAL RADIOLOGY | Age: 71
Discharge: HOME | End: 2021-09-01
Payer: MEDICARE

## 2021-09-01 DIAGNOSIS — R93.3 ABNORMAL FINDINGS ON EXAMINATION OF GASTROINTESTINAL TRACT: ICD-10-CM

## 2021-09-01 LAB
FOLATE: 10.3 NG/ML (ref 4.8–24.2)
HCT VFR BLD CALC: 37.8 % (ref 42–52)
HEMOGLOBIN: 11.6 GM/DL (ref 14–18)
INR BLD: 1.06 (ref 0.85–1.13)
VITAMIN B-12: 413 PG/ML (ref 211–911)

## 2021-09-01 PROCEDURE — 74018 RADEX ABDOMEN 1 VIEW: CPT

## 2021-09-04 LAB — SOLUBLE TRANSFERRIN RECEPT: 5.9 MG/L (ref 2.2–5)

## 2022-03-01 ENCOUNTER — PATIENT MESSAGE (OUTPATIENT)
Dept: CARDIOLOGY CLINIC | Age: 72
End: 2022-03-01

## 2022-03-01 RX ORDER — HYDROCHLOROTHIAZIDE 25 MG/1
25 TABLET ORAL DAILY
Qty: 90 TABLET | Refills: 3 | Status: SHIPPED | OUTPATIENT
Start: 2022-03-01

## 2022-04-25 ENCOUNTER — OFFICE VISIT (OUTPATIENT)
Dept: CARDIOLOGY CLINIC | Age: 72
End: 2022-04-25
Payer: MEDICARE

## 2022-04-25 VITALS
HEIGHT: 70 IN | WEIGHT: 196 LBS | HEART RATE: 45 BPM | BODY MASS INDEX: 28.06 KG/M2 | DIASTOLIC BLOOD PRESSURE: 72 MMHG | SYSTOLIC BLOOD PRESSURE: 137 MMHG

## 2022-04-25 DIAGNOSIS — I25.10 ASHD (ARTERIOSCLEROTIC HEART DISEASE): Primary | ICD-10-CM

## 2022-04-25 DIAGNOSIS — I20.9 ANGINA PECTORIS, UNSPECIFIED (HCC): ICD-10-CM

## 2022-04-25 DIAGNOSIS — I25.119 ATHEROSCLEROSIS OF NATIVE CORONARY ARTERY OF NATIVE HEART WITH ANGINA PECTORIS (HCC): ICD-10-CM

## 2022-04-25 DIAGNOSIS — I10 PRIMARY HYPERTENSION: ICD-10-CM

## 2022-04-25 DIAGNOSIS — E78.01 FAMILIAL HYPERCHOLESTEROLEMIA: ICD-10-CM

## 2022-04-25 PROCEDURE — 99213 OFFICE O/P EST LOW 20 MIN: CPT | Performed by: NUCLEAR MEDICINE

## 2022-04-25 PROCEDURE — 93000 ELECTROCARDIOGRAM COMPLETE: CPT | Performed by: NUCLEAR MEDICINE

## 2022-04-25 RX ORDER — NITROGLYCERIN 0.4 MG/1
TABLET SUBLINGUAL
Qty: 25 TABLET | Refills: 3 | Status: SHIPPED | OUTPATIENT
Start: 2022-04-25

## 2022-04-25 RX ORDER — SIMVASTATIN 40 MG
40 TABLET ORAL NIGHTLY
Qty: 90 TABLET | Refills: 3 | Status: SHIPPED | OUTPATIENT
Start: 2022-04-25

## 2022-04-25 RX ORDER — OMEPRAZOLE 40 MG/1
40 CAPSULE, DELAYED RELEASE ORAL DAILY
COMMUNITY
Start: 2022-04-24 | End: 2022-08-01 | Stop reason: SDUPTHER

## 2022-07-29 ENCOUNTER — HOSPITAL ENCOUNTER (OUTPATIENT)
Dept: NON INVASIVE DIAGNOSTICS | Age: 72
Discharge: HOME | End: 2022-07-29
Payer: MEDICARE

## 2022-07-29 ENCOUNTER — TELEPHONE (OUTPATIENT)
Dept: CARDIOLOGY CLINIC | Age: 72
End: 2022-07-29

## 2022-07-29 DIAGNOSIS — R00.1 BRADYCARDIA: ICD-10-CM

## 2022-07-29 DIAGNOSIS — R00.1 BRADYCARDIA: Primary | ICD-10-CM

## 2022-07-29 PROCEDURE — 93225 XTRNL ECG REC<48 HRS REC: CPT

## 2022-07-29 PROCEDURE — 93226 XTRNL ECG REC<48 HR SCAN A/R: CPT

## 2022-07-29 NOTE — TELEPHONE ENCOUNTER
I talked to Leonie-wife at length and she has many concerns. HR today is 40 but his baseline in 46 last few visits with Dr. Therese Henry. She states he does have some shortness of breath but denies having any dizziness or passing out. She states he bikes 8 miles per day. I advised her that if HR gets much lower he needs to go to ER. Also advised if patient having dizziness or syncope needs to seek medical attention. Discussed with Ely Yanes and verbal order received for 48 hour holter monitor. Scheduling notified this needs placed today if possible. Please agree Ely Yanes. Patient has an appt on 8/17 with Dr. Therese Henry.

## 2022-07-29 NOTE — TELEPHONE ENCOUNTER
48 hr holter scheduled 07-29-22  Wife informed to send pt to heart center at [facility], they will work him in for 48 hr holter monitor

## 2022-07-29 NOTE — PROCEDURES
48 hour Holter Monitor was applied to patient.  Patient was instructed to remove monitor on 7/31 at 1419 and return to  of hospital. The serial number on the Holter Monitor is [id number]

## 2022-07-29 NOTE — TELEPHONE ENCOUNTER
Patient's spouse called in regards to a mychart message encounter. She was wanting to know when Dr. Kimberly Reyes would be back in the office. She advised me of the same things that are noted in the [facility] message and was concerned about his SOB. She is wanting to know if he can be seen by Kimberly Reyes when he returns to the office. Please contact patient/spouse for scheduling.

## 2022-08-03 LAB
ACQUISITION DURATION: NORMAL S
AVERAGE HEART RATE: 50 BPM
HOOKUP DATE: NORMAL
HOOKUP TIME: NORMAL
MAX HEART RATE TIME/DATE: NORMAL
MAX HEART RATE: 95 BPM
MIN HEART RATE TIME/DATE: NORMAL
MIN HEART RATE: 35 BPM
NUMBER OF QRS COMPLEXES: NORMAL
NUMBER OF SUPRAVENTRICULAR COUPLETS: 0
NUMBER OF SUPRAVENTRICULAR ECTOPICS: 1757
NUMBER OF SUPRAVENTRICULAR ISOLATED BEATS: 1587
NUMBER OF VENTRICULAR BIGEMINAL CYCLES: 0
NUMBER OF VENTRICULAR COUPLETS: 14
NUMBER OF VENTRICULAR ECTOPICS: 918

## 2022-08-17 ENCOUNTER — OFFICE VISIT (OUTPATIENT)
Dept: CARDIOLOGY CLINIC | Age: 72
End: 2022-08-17
Payer: MEDICARE

## 2022-08-17 VITALS
DIASTOLIC BLOOD PRESSURE: 68 MMHG | HEIGHT: 70 IN | HEART RATE: 48 BPM | BODY MASS INDEX: 28.49 KG/M2 | WEIGHT: 199 LBS | SYSTOLIC BLOOD PRESSURE: 132 MMHG

## 2022-08-17 DIAGNOSIS — R00.1 BRADYCARDIA: Primary | ICD-10-CM

## 2022-08-17 DIAGNOSIS — R06.02 SHORTNESS OF BREATH: ICD-10-CM

## 2022-08-17 PROCEDURE — 99214 OFFICE O/P EST MOD 30 MIN: CPT | Performed by: NUCLEAR MEDICINE

## 2022-08-17 PROCEDURE — 1123F ACP DISCUSS/DSCN MKR DOCD: CPT | Performed by: NUCLEAR MEDICINE

## 2022-08-17 NOTE — PROGRESS NOTES
[address] [facility] [address] [address]  [address]  [facility] [address]  Dept: [phone]  Dept Fax: [phone]  Loc: [phone]    Visit Date: 8/17/2022    Doris To is a 74 y.o. male who presents todayfor:  Chief Complaint   Patient presents with    Follow-up    Shortness of Breath   Continues to have low HR  In the 40s  Holter with average Hr 50   No pauses  Some dyspnea  More than usual  Some chest pain  Cath 2021  Mild CAD  Does exercise  Some fatigue  On statins for hyperlipidemia        HPI:  HPI  Past Medical History:   Diagnosis Date    Arthritis     ASHD (arteriosclerotic heart disease) 1/21/2013    Eructation 8/14/2014    GERD (gastroesophageal reflux disease)     Hyperlipidemia     Splenomegaly 9/15/2014      Past Surgical History:   Procedure Laterality Date    APPENDECTOMY  09/22/2016    Dr. Loja Hem  07/26/2016    ,   [address] STENT PLACEMENT  2013    2 stents lad    FINGER SURGERY       Family History   Problem Relation Age of Onset    Heart Disease Father 50        MI    Stroke Father     Heart Disease Brother 61        STENTS    Arthritis Sister     Heart Disease Brother     Heart Disease Brother     No Known Problems Brother     No Known Problems Brother     No Known Problems Brother     No Known Problems Sister     No Known Problems Sister     No Known Problems Sister      Social History     Tobacco Use    Smoking status: Never    Smokeless tobacco: Never   Substance Use Topics    Alcohol use: Yes     Alcohol/week: 1.0 standard drink     Types: 1 Glasses of wine per week     Comment: socially      Current Outpatient Medications   Medication Sig Dispense Refill    omeprazole (PRILOSEC) 40 MG delayed release capsule Take 1 capsule by mouth in the morning.  90 capsule 2    simvastatin (ZOCOR) 40 MG tablet Take 1 tablet by mouth nightly 90 tablet 3    nitroGLYCERIN (NITROSTAT) 0.4 MG SL tablet DISSOLVE 1 TAB UNDER TONGUE EVERY 5 MINUTES AS NEEDED FOR CHEST PAIN. After third dose and still no relief, call 911 25 tablet 3    Multiple Vitamins-Minerals (THERAPEUTIC MULTIVITAMIN-MINERALS) tablet Take 1 tablet by mouth daily      hydroCHLOROthiazide (HYDRODIURIL) 25 MG tablet Take 1 tablet by mouth daily 90 tablet 3    aspirin 81 MG tablet Take 81 mg by mouth daily. Take 2 tablets daily. No current facility-administered medications for this visit. No Known Allergies  Health Maintenance   Topic Date Due    Hepatitis C screen  Never done    COVID-19 Vaccine (4 - Booster for Moderna series) 03/03/2022    Flu vaccine (1) 09/01/2022    Lipids  03/29/2023    Depression Screen  04/11/2023    Pneumococcal 65+ years Vaccine (2 - PCV) 04/11/2023    Annual Wellness Visit (AWV)  04/12/2023    DTaP/Tdap/Td vaccine (3 - Td or Tdap) 10/23/2028    Colorectal Cancer Screen  08/02/2031    Shingles vaccine  Completed    Hepatitis A vaccine  Aged Out    Hepatitis B vaccine  Aged Out    Hib vaccine  Aged Out    Meningococcal (ACWY) vaccine  Aged Out       Subjective:  Review of Systems  General:   No fever, no chills, some fatigue or weight loss  Pulmonary:    some dyspnea, no wheezing  Cardiac:    Denies recent chest pain,   GI:     No nausea or vomiting, no abdominal pain  Neuro:     No dizziness or light headedness,   Musculoskeletal:  No recent active issues  Extremities:   No edema, no obvious claudication     Objective:  Physical Exam  /68   Pulse (!) 48   Ht 5' 10\" (1.778 m)   Wt 199 lb (90.3 kg)   BMI 28.55 kg/m²   General:   Well developed, well nourished  Lungs:    Clear to auscultation  Heart:    Normal S1 S2, Slight murmur. no rubs, no gallops  Abdomen:   Soft, non tender, no organomegalies, positive bowel sounds  Extremities:   No edema, no cyanosis, good peripheral pulses  Neurological:   Awake, alert, oriented.  No obvious focal deficits  Musculoskelatal:  No obvious deformities    Assessment: Diagnosis Orders   1. Bradycardia        2. Shortness of breath        As above  Chronic bradycardia   ? ? The cause of his symptoms  Cath 2021 was with mild CAD    Plan:  No follow-ups on file. Discussed  No clear indication for a pacer  Unless having issues with chronotropic cardiac issues   ? ? Angina   Vs other causes  Consider a stress test to assess HR response   Vs refer to EP for opinion   Vs linq   Continue risk factor modification and medical managementThank you for allowing me to participate in the care of your patient. Please don't hesitate to contact me regarding any further issues related to the patient care    Orders Placed:  No orders of the defined types were placed in this encounter. Medications Prescribed:  No orders of the defined types were placed in this encounter. Discussed use, benefit, and side effects of prescribed medications. All patient questions answered. Pt voicedunderstanding. Instructed to continue current medications, diet and exercise. Continue risk factor modification and medical management. Patient agreed with treatment plan. Follow up as directed.     Electronically signedby Timmy Rainey MD on 8/17/2022 at 10:44 AM

## 2022-08-18 ENCOUNTER — TELEPHONE (OUTPATIENT)
Dept: CARDIOLOGY CLINIC | Age: 72
End: 2022-08-18

## 2022-08-18 NOTE — TELEPHONE ENCOUNTER
Ref from [facility]  30 day event monitor scheduled for 08.25.2022  Appt will need to be scheduled after monitor. Bradycardia  ? ? Angina  Vs other causes  Consider a stress test to assess HR response  Vs refer to EP for opinion  Vs linq   Continue risk factor modification and medical managementThank you for allowing me to participate in the care of your patient.  Please don't hesitate to contact me regarding any further issues related to the patient care

## 2022-08-25 ENCOUNTER — HOSPITAL ENCOUNTER (OUTPATIENT)
Dept: NON INVASIVE DIAGNOSTICS | Age: 72
Discharge: HOME | End: 2022-08-25
Payer: MEDICARE

## 2022-08-25 DIAGNOSIS — R00.1 BRADYCARDIA: ICD-10-CM

## 2022-08-25 DIAGNOSIS — R06.02 SHORTNESS OF BREATH: ICD-10-CM

## 2022-08-25 PROCEDURE — 93270 REMOTE 30 DAY ECG REV/REPORT: CPT

## 2022-08-25 NOTE — PROCEDURES
30 Day Cardiac Event Monitor was applied to patient. Instructions were given and skin/monitor prep and application was demonstrated. Patient was instructed to remove monitor on 8/26 and mail back to [employer].

## 2022-09-28 ENCOUNTER — TELEPHONE (OUTPATIENT)
Dept: CARDIOLOGY CLINIC | Age: 72
End: 2022-09-28

## 2022-09-28 NOTE — PROCEDURES
[address] [place] [address]                                 EVENT MONITOR    PATIENT NAME: Alyse Kidd                  :        1950  MED REC NO:   [id number]                           ROOM:  ACCOUNT NO:   [de-identified]                           ADMIT DATE: 2022  PROVIDER:     Jorge Sullivan M.D.    TEST TYPE:  Event monitor. CLINICAL HISTORY AND INDICATION:  This patient with bradycardia. EVENT MONITOR DESCRIPTION:  Event monitor was attached to the patient  between 2022 to 2022. EVENT MONITOR FINDINGS:  Baseline rhythm showed sinus rhythm with  frequent premature ventricular beats and episodes of ventricular  couplets and triplets as well as ventricular bigeminy occurring on  multiple occasions. Short three to four beats of  nonsustained V-tach was noted, no pauses. CONCLUSION:  1. Sinus rhythm with very heavy burden premature ventricular beats. 2.  Several episodes of ventricular bigeminy and episodes of ventricular  couplets and triplets with short nonsustained ventricular tachycardia. 3.  Abnormal event monitor.         Raul Herrera M.D.    D: 2022 9:47:13       T: 2022 11:35:35     [email or web address]  Job#: [id number]     Doc#: [id number]    CC:

## 2022-09-28 NOTE — TELEPHONE ENCOUNTER
Review abnormal event monitor with frequent PVC's. Patient has a new appointment with Dr. Poppy Lentz on 10-12-22.

## 2022-10-12 ENCOUNTER — TELEPHONE (OUTPATIENT)
Dept: CARDIOLOGY CLINIC | Age: 72
End: 2022-10-12

## 2022-10-12 ENCOUNTER — OFFICE VISIT (OUTPATIENT)
Dept: CARDIOLOGY CLINIC | Age: 72
End: 2022-10-12
Payer: MEDICARE

## 2022-10-12 VITALS
HEIGHT: 70 IN | BODY MASS INDEX: 28.95 KG/M2 | HEART RATE: 44 BPM | DIASTOLIC BLOOD PRESSURE: 67 MMHG | WEIGHT: 202.2 LBS | SYSTOLIC BLOOD PRESSURE: 148 MMHG

## 2022-10-12 DIAGNOSIS — R06.02 SOB (SHORTNESS OF BREATH) ON EXERTION: Primary | ICD-10-CM

## 2022-10-12 DIAGNOSIS — R00.1 BRADYCARDIA: ICD-10-CM

## 2022-10-12 PROCEDURE — 93000 ELECTROCARDIOGRAM COMPLETE: CPT | Performed by: INTERNAL MEDICINE

## 2022-10-12 PROCEDURE — 99204 OFFICE O/P NEW MOD 45 MIN: CPT | Performed by: INTERNAL MEDICINE

## 2022-10-12 PROCEDURE — 1123F ACP DISCUSS/DSCN MKR DOCD: CPT | Performed by: INTERNAL MEDICINE

## 2022-10-12 RX ORDER — ASPIRIN 81 MG/1
81 TABLET ORAL DAILY
COMMUNITY

## 2022-10-12 RX ORDER — IBUPROFEN 200 MG
200 TABLET ORAL EVERY 6 HOURS PRN
COMMUNITY

## 2022-10-12 NOTE — PROGRESS NOTES
[address] [facility]  Dept: [phone]  Cardiac Electrophysiology: Consultation Note  Patient's demographics:  Date:   10/12/2022  Patient name:              Ronal Cheung  YOB: 1950  Sex:    male   MRN:   [id number]    Primary Care Physician:  Marco Burgos MD    Referring Physician:  Andressa Perez MD    Reason for Consultation:  Sinus bradycardia, evaluation for a pacemaker implantation. Clinical Summary:  72/M with history of fatigue, lack of energy and some shortness of breath for the last 6 months. He was evaluated by Dr. Lisa Schwartz and noted to have sinus bradycardia on electrocardiogram.  His echocardiogram and cardiac catheterizations from 2021 were unremarkable. He had an event monitor recently that showed episodes of sinus bradycardia both during day and night and frequent premature ventricular complexes and short runs of NSVT, burden 6%. He was referred here for evaluation of pacemaker implantation. Denies chest pain, orthopnea, proximal nocturnal dyspnea. He does occasionally notice lower extremity swelling which improved following initiation of hydrochlorothiazide. He is otherwise fairly active. Does not smoke and drinks socially. Multiple family members with coronary artery disease. Medical Hx: Sinus bradycardia, non-obstructive CAD, HPL, and splenomegaly. Review of systems:  Constitutional: Negative for chills and fever  HENT: Negative for congestion, sinus pressure, sneezing and sore throat. Eyes: Negative for pain, discharge, redness and itching. Respiratory: Negative for apnea, cough  Gastrointestinal: Negative for blood in stool, constipation, diarrhea   Endocrine: Negative for cold intolerance, heat intolerance, polydipsia. Genitourinary: Negative for dysuria, enuresis, flank pain and hematuria. Musculoskeletal: Negative for arthralgias, joint swelling and neck pain. Neurological: Negative for numbness and headaches. Psychiatric/Behavioral: Negative for agitation, confusion, decreased concentration and dysphoric mood. Past Medical History[de-identified]  Past Medical History:   Diagnosis Date    Arthritis     ASHD (arteriosclerotic heart disease) 1/21/2013    Eructation 8/14/2014    GERD (gastroesophageal reflux disease)     Hyperlipidemia     Splenomegaly 9/15/2014       Past Surgical History:  Past Surgical History:   Procedure Laterality Date    APPENDECTOMY  09/22/2016    Dr. Coleman Ringwood    COLONOSCOPY  07/26/2016    , [address] [facility] [address],[facility] [address]  2013    2 stents lad    FINGER SURGERY         Family History:  Family History   Problem Relation Age of Onset    Heart Disease Father 50        MI    Stroke Father     Heart Disease Brother 61        STENTS    Arthritis Sister     Heart Disease Brother     Heart Disease Brother     No Known Problems Brother     No Known Problems Brother     No Known Problems Brother     No Known Problems Sister     No Known Problems Sister     No Known Problems Sister         Social History:  Social History     Socioeconomic History    Marital status:    Occupational History    Occupation:      Comment: school bus sub   Tobacco Use    Smoking status: Never    Smokeless tobacco: Never   Vaping Use    Vaping Use: Never used   Substance and Sexual Activity    Alcohol use: Yes     Alcohol/week: 1.0 standard drink     Types: 1 Glasses of wine per week     Comment: socially    Drug use: No     Social Determinants of Health     Physical Activity: Sufficiently Active    Days of Exercise per Week: 5 days    Minutes of Exercise per Session: 40 min        Allergies:  No Known Allergies     Medications:  Current Outpatient Medications   Medication Sig Dispense Refill    omeprazole (PRILOSEC) 40 MG delayed release capsule Take 1 capsule by mouth in the morning.  90 capsule 2    simvastatin (ZOCOR) 40 MG tablet Take 1 tablet by mouth nightly 90 tablet 3    nitroGLYCERIN (NITROSTAT) 0.4 MG SL tablet DISSOLVE 1 TAB UNDER TONGUE EVERY 5 MINUTES AS NEEDED FOR CHEST PAIN. After third dose and still no relief, call 911 25 tablet 3    Multiple Vitamins-Minerals (THERAPEUTIC MULTIVITAMIN-MINERALS) tablet Take 1 tablet by mouth daily      hydroCHLOROthiazide (HYDRODIURIL) 25 MG tablet Take 1 tablet by mouth daily 90 tablet 3    aspirin 81 MG tablet Take 81 mg by mouth daily. Take 2 tablets daily. No current facility-administered medications for this visit. Physical Examination:  Vitals:    10/12/22 0909   BP: (!) 148/67   Pulse: (!) 44   Body mass index is 29.01 kg/m². GENERAL: Alert and oriented. No distress. EYES: No pallor or icterus. ENT: No cyanosis. No thyromegaly or cervical LAP. VESSELS: No jugular venous distension or carotid bruits. HEART: Normal S1/S2. No murmur, rub or gallop. LUNGS: Clear to auscultation. ABDOMEN: Soft and non-tender. EXTREMITIES: No lower extremity edema. Feet are warm. NEUROLOGICAL: Grossly normal.     Laboratory And Diagnostic Data  I have personally reviewed and interpreted the results of the following diagnostic testing    Lab Results   Component Value Date    WBC 4.4 03/29/2022    HGB 12.8 (L) 03/29/2022    HCT 40.1 03/29/2022     (L) 03/29/2022    CHOL 158 03/29/2022    TRIG 73 03/29/2022    HDL 41 03/29/2022    ALT 12 03/29/2022    AST 18 03/29/2022     03/29/2022    K 4.2 03/29/2022     03/29/2022    CREATININE 1.16 03/29/2022    BUN 16 03/29/2022    CO2 32 03/29/2022    INR 1.06 09/01/2021     Echocardiogram 4/21/2022: LVEF 55%, LVWT 0.9 cm, LAD/MICKI 58. No significant valvular abnormalities. ECG Sinus bradycardia (44 BPM) other wise normal.   Coronary angiogram 5/12/2021: Non-obstructive CAD. Event Monitor: Sinus rhythm. Average heart rate 44,PVC/NSVT (burden 6%) and PACs with short runs of atrial tachycardia wit aberrancy. Lowest HR 33 BPM during sleep. Impression:  Symptomatic sinus bradycardia. Premature atrial complexes, burden 6%. Preserved LVEF. Nonoperative coronary artery disease. Hyperlipidemia. Assessment And Recommendations: The patient does not have any reversible cause to explain his sinus bradycardia. He has been symptomatic for at least 6 months. He has preserved LVEF and no significant coronary artery disease on coronary angiogram.  He also has indication for beta-blockers for management of his major ventricular complexes. I think he will be benefited by a dual-chamber pacemaker implantation. Discussed my diagnosis and management options. He wishes to proceed. Risk and benefit discussed. Questions answered    Thank you for allowing me to participate in the care of your patient. Please call me if you have any questions. **This report has been created using voice recognition software. It may contain minor errors which are inherent in voice recognition technology. **       Behzad Marquez MD, MRCP, [facility], [facility] on 10/12/2022 at 9:34 AM

## 2022-10-12 NOTE — TELEPHONE ENCOUNTER
Procedure: new dual pace  Date: 10.21.2022  Arrival Time: 9am  Meds to Hold: none      Instructions verbalized to the patient. Instructions mailed to the patient.

## 2022-10-20 ENCOUNTER — PREP FOR PROCEDURE (OUTPATIENT)
Dept: CARDIOLOGY | Age: 72
End: 2022-10-20

## 2022-10-20 RX ORDER — SODIUM CHLORIDE 9 MG/ML
INJECTION, SOLUTION INTRAVENOUS PRN
Status: CANCELLED | OUTPATIENT
Start: 2022-10-20

## 2022-10-20 RX ORDER — SODIUM CHLORIDE 0.9 % (FLUSH) 0.9 %
5-40 SYRINGE (ML) INJECTION PRN
Status: CANCELLED | OUTPATIENT
Start: 2022-10-20

## 2022-10-20 RX ORDER — SODIUM CHLORIDE 0.9 % (FLUSH) 0.9 %
5-40 SYRINGE (ML) INJECTION EVERY 12 HOURS SCHEDULED
Status: CANCELLED | OUTPATIENT
Start: 2022-10-20

## 2022-10-21 ENCOUNTER — APPOINTMENT (OUTPATIENT)
Dept: GENERAL RADIOLOGY | Age: 72
End: 2022-10-21
Attending: INTERNAL MEDICINE
Payer: MEDICARE

## 2022-10-21 ENCOUNTER — HOSPITAL ENCOUNTER (OUTPATIENT)
Dept: INPATIENT UNIT | Age: 72
Discharge: HOME | End: 2022-10-21
Attending: INTERNAL MEDICINE | Admitting: INTERNAL MEDICINE
Payer: MEDICARE

## 2022-10-21 VITALS
HEART RATE: 64 BPM | TEMPERATURE: 97.7 F | WEIGHT: 200 LBS | HEIGHT: 70 IN | DIASTOLIC BLOOD PRESSURE: 74 MMHG | BODY MASS INDEX: 28.63 KG/M2 | OXYGEN SATURATION: 97 % | RESPIRATION RATE: 16 BRPM | SYSTOLIC BLOOD PRESSURE: 130 MMHG

## 2022-10-21 LAB
ANION GAP SERPL CALCULATED.3IONS-SCNC: 9 MEQ/L (ref 8–16)
APTT: 30.6 SECONDS (ref 22–38)
BUN BLDV-MCNC: 21 MG/DL (ref 7–22)
CALCIUM SERPL-MCNC: 9.3 MG/DL (ref 8.5–10.5)
CHLORIDE BLD-SCNC: 104 MEQ/L (ref 98–111)
CO2: 30 MEQ/L (ref 23–33)
CREAT SERPL-MCNC: 1.1 MG/DL (ref 0.4–1.2)
EKG ATRIAL RATE: 60 BPM
EKG ATRIAL RATE: 62 BPM
EKG P AXIS: 68 DEGREES
EKG P AXIS: 69 DEGREES
EKG P-R INTERVAL: 176 MS
EKG P-R INTERVAL: 178 MS
EKG Q-T INTERVAL: 430 MS
EKG Q-T INTERVAL: 432 MS
EKG QRS DURATION: 134 MS
EKG QRS DURATION: 138 MS
EKG QTC CALCULATION (BAZETT): 432 MS
EKG QTC CALCULATION (BAZETT): 436 MS
EKG R AXIS: 101 DEGREES
EKG R AXIS: 107 DEGREES
EKG T AXIS: -33 DEGREES
EKG T AXIS: -34 DEGREES
EKG VENTRICULAR RATE: 60 BPM
EKG VENTRICULAR RATE: 62 BPM
ERYTHROCYTE [DISTWIDTH] IN BLOOD BY AUTOMATED COUNT: 14.6 % (ref 11.5–14.5)
ERYTHROCYTE [DISTWIDTH] IN BLOOD BY AUTOMATED COUNT: 44.3 FL (ref 35–45)
GFR SERPL CREATININE-BSD FRML MDRD: > 60 ML/MIN/1.73M2
GLUCOSE BLD-MCNC: 96 MG/DL (ref 70–108)
HCT VFR BLD CALC: 41.7 % (ref 42–52)
HEMOGLOBIN: 13.5 GM/DL (ref 14–18)
INR BLD: 1.05 (ref 0.85–1.13)
MCH RBC QN AUTO: 27.3 PG (ref 26–33)
MCHC RBC AUTO-ENTMCNC: 32.4 GM/DL (ref 32.2–35.5)
MCV RBC AUTO: 84.2 FL (ref 80–94)
PLATELET # BLD: 143 THOU/MM3 (ref 130–400)
PMV BLD AUTO: 9.4 FL (ref 9.4–12.4)
POTASSIUM REFLEX MAGNESIUM: 3.8 MEQ/L (ref 3.5–5.2)
RBC # BLD: 4.95 MILL/MM3 (ref 4.7–6.1)
SODIUM BLD-SCNC: 143 MEQ/L (ref 135–145)
WBC # BLD: 5.7 THOU/MM3 (ref 4.8–10.8)

## 2022-10-21 PROCEDURE — 93010 ELECTROCARDIOGRAM REPORT: CPT | Performed by: INTERNAL MEDICINE

## 2022-10-21 PROCEDURE — 36415 COLL VENOUS BLD VENIPUNCTURE: CPT

## 2022-10-21 PROCEDURE — 85027 COMPLETE CBC AUTOMATED: CPT

## 2022-10-21 PROCEDURE — 80048 BASIC METABOLIC PNL TOTAL CA: CPT

## 2022-10-21 PROCEDURE — 6360000002 HC RX W HCPCS: Performed by: NURSE PRACTITIONER

## 2022-10-21 PROCEDURE — 85730 THROMBOPLASTIN TIME PARTIAL: CPT

## 2022-10-21 PROCEDURE — 6360000002 HC RX W HCPCS

## 2022-10-21 PROCEDURE — 2580000003 HC RX 258: Performed by: NURSE PRACTITIONER

## 2022-10-21 PROCEDURE — 33208 INSRT HEART PM ATRIAL & VENT: CPT

## 2022-10-21 PROCEDURE — 85610 PROTHROMBIN TIME: CPT

## 2022-10-21 PROCEDURE — 2580000003 HC RX 258: Performed by: INTERNAL MEDICINE

## 2022-10-21 PROCEDURE — C1898 LEAD, PMKR, OTHER THAN TRANS: HCPCS

## 2022-10-21 PROCEDURE — 6360000002 HC RX W HCPCS: Performed by: INTERNAL MEDICINE

## 2022-10-21 PROCEDURE — 71046 X-RAY EXAM CHEST 2 VIEWS: CPT

## 2022-10-21 PROCEDURE — C1785 PMKR, DUAL, RATE-RESP: HCPCS

## 2022-10-21 PROCEDURE — 2500000003 HC RX 250 WO HCPCS

## 2022-10-21 PROCEDURE — A4217 STERILE WATER/SALINE, 500 ML: HCPCS | Performed by: NURSE PRACTITIONER

## 2022-10-21 PROCEDURE — 33208 INSRT HEART PM ATRIAL & VENT: CPT | Performed by: INTERNAL MEDICINE

## 2022-10-21 PROCEDURE — 93005 ELECTROCARDIOGRAM TRACING: CPT | Performed by: INTERNAL MEDICINE

## 2022-10-21 PROCEDURE — C1894 INTRO/SHEATH, NON-LASER: HCPCS

## 2022-10-21 RX ORDER — SODIUM CHLORIDE 9 MG/ML
INJECTION, SOLUTION INTRAVENOUS PRN
Status: DISCONTINUED | OUTPATIENT
Start: 2022-10-21 | End: 2022-10-21 | Stop reason: HOSPADM

## 2022-10-21 RX ORDER — SODIUM CHLORIDE 0.9 % (FLUSH) 0.9 %
5-40 SYRINGE (ML) INJECTION PRN
Status: DISCONTINUED | OUTPATIENT
Start: 2022-10-21 | End: 2022-10-21 | Stop reason: HOSPADM

## 2022-10-21 RX ORDER — SODIUM CHLORIDE 9 MG/ML
INJECTION, SOLUTION INTRAVENOUS CONTINUOUS
Status: DISCONTINUED | OUTPATIENT
Start: 2022-10-21 | End: 2022-10-21 | Stop reason: HOSPADM

## 2022-10-21 RX ORDER — CEFAZOLIN SODIUM 1 G/50ML
1000 INJECTION, SOLUTION INTRAVENOUS
Status: DISCONTINUED | OUTPATIENT
Start: 2022-10-21 | End: 2022-10-21 | Stop reason: ALTCHOICE

## 2022-10-21 RX ORDER — SODIUM CHLORIDE 0.9 % (FLUSH) 0.9 %
5-40 SYRINGE (ML) INJECTION EVERY 12 HOURS SCHEDULED
Status: DISCONTINUED | OUTPATIENT
Start: 2022-10-21 | End: 2022-10-21 | Stop reason: HOSPADM

## 2022-10-21 RX ADMIN — SODIUM CHLORIDE: 9 INJECTION, SOLUTION INTRAVENOUS at 09:51

## 2022-10-21 RX ADMIN — VANCOMYCIN HYDROCHLORIDE 250 MG: 1 INJECTION, POWDER, LYOPHILIZED, FOR SOLUTION INTRAVENOUS at 11:43

## 2022-10-21 RX ADMIN — CEFAZOLIN 2000 MG: 10 INJECTION, POWDER, FOR SOLUTION INTRAVENOUS at 09:51

## 2022-10-21 NOTE — BRIEF OP NOTE
Brief Postoperative Note    Date:   10/21/2022  Patient name: Zac Agudelo  YOB: 1950  Sex: male   MRN:   [id number]    PCP: Camila Holland MD     Procedure: Dual Chamber pacemaker implantation    Pre-Op Diagnosis: Sick sinus syndrome    Post-Op Diagnosis: Same    Surgeon: Mari Garvey MD, MRCP, [address], [address] [place] [address] Drive    Assistant: Charli Parada. Anesthesia/sedation:  Local lidocaine/fentanyl and midazolam as needed. Estimated Blood Loss (mL): Minimal    Complications: None    Recommendations:  See Epic orders. Bed rest for 2 hours. Keep pocket site dry. No shower for 7 days ((sponge bath and tub bath OK). ICE packs locally. Monitor site for bleeding or swelling. Pressure dressing x 24 hours. Chest x-ray PA and lateral views. Follow up in device clinic in 1 week.        Electronically signed by Mylene Holland MD, Shannen Rosario on 10/21/2022 at 12:22 PM

## 2022-10-21 NOTE — PROCEDURES
[address] [facility]  Dept: [phone]  ELECTROPHYSIOLOGY PROCEDURE NOTE  Patients Demographics:  Date:   10/21/2022  Patient name:              Flores [facility]  YOB: 1950  Sex: male   MRN:   [id number]    Primary Care Provider:    Devon Hamman, MD     Procedure Planned:  Dual chamber pacemaker implantation. Cardiologist:  Bhumika Cortes MD     Indications for Procedure  Sick sinus syndrome. Brief Medical History:  72/M with symptomatic sinus bradycardia electively presents for a dual chamber pacemaker implantation. Preserved LVEF. Medical Hx: Sinus bradycardia, non-obstructive CAD, HPL, and splenomegaly. Procedure Performed:  Dual chamber pacemaker implantation (Deep septal). Ultrasound guided left axillary vein accesses. Fluoroscopy of the leads. Procedure Details: The patient was brought to the electrophysiology lab in a fasting and non-sedated state. He was prepped and draped in the usual sterile fashion. Intravenous Fentanyl and Midazolam as needed was used for conscious sedation. The left pectoral region was liberally infiltrated with 2% lidocaine for local anesthesia. Left axillary vein was cannulated x2 under ultrasound guidance using 21G micro-puncture needle and 2 standard J-tip 0.035 inch guidewires were advanced into IVC. A 4 cm long incision was made in the left pectoral region using #10 blade and a subcutaneous pocket was made. Two 7-French hemostatic peel away sheaths were advanced into the axillary vein over the guide wires. Through one of the sheaths right ventricular pacemaker lead was inserted and advanced under fluoro guidance and positioned into the right ventricular mid septum. After confirming right bundle capture with 5V pacing,, the helix was deployed  into deep septum. The paced QRS complex at this site was narrow (100 ms).  The right atrial lead was then advanced through another sheath and positioned into the right atrial appendage. After deployment of helix the electric parameters of the leads were checked using external analyzing system and noted to be adequate. High output pacing with 10 volts was performed with both the leads and there was no phrenic nerve capture. The leads were secured to the pectoral fascia using 0 Ethibond over the suture sleeves. The pocket was thoroughly irrigated with antibiotic solution. The leads were attached to the pulse generator and lead parameters were tested one more time. The leads were wrapped behind the device and the device was placed inside the pocket. The pocket was closed in 3 layers using, 2-0, 3-0 and 4-0 Vicryl sutures. The incision was covered by steri-strips and site covered with by a safeguard cool pressure dressing. The fluoroscopy of the chest showed no pneumothorax or pericardial or pleural effusion. The patient's hemodynamics were monitored throughout the procedure. He tolerated the procedure well. Medications:  Midazolam 2 mg intravenously. Fentanyl 75 mcg intravenously. Cefazolin 2 gm intravenously for surgical prophylaxis. Lidocaine 2%, 20 cc for local anaesthesia. Fluoroscopy Time:  4.3 minutes. Blood Loss:  Minimal.     Complication:  None. Implanted Device:  Pulse generator: Medtronic, model I6645118 and serial [id number]. Right atrial lead:  Medtronic, model [id number] and serial [id number]. Right ventricular lead: Medtronic, model [id number] and serial [id number]. Intraoperative Electric Parameters:  RA lead: [email or web address] RV lead: [email or web address]    Bradycardia Settings:  MVP  pulses per minute. Summary:  Successful dual chamber pacemaker implantation (deep septal). Fluoroscopy of the leads. Normal pacemaker functions. Recommendations   Observation for 2 hours. Chest x-ray (2 views) and device check in 2 hours.   Discharge and post operative care per protocol.        Electronically signed by Hayes Ortega MD, Patricia Rosas on 10/21/2022 at 12:27 PM

## 2022-10-21 NOTE — PROGRESS NOTES
Gerhardt Pu underwent uneventful dual chamber pacemaker implantation. He was seen and examined post operatively. Cheat x-ray and device interrogation reviewed. The device pocket site has a pressure dressing on. No major complaints. Hemodynamically stable, tolerating food and ambulating. No acute concerns. Postoperative care and follow up discussed. Contact information provided. Can be safely discharged home. Patient in agreement. Discussed with nursing staff.        Shahnaz Yip MD, JOSEYP, Estelita Haywood on 10/21/2022 at 5:15 PM

## 2022-10-21 NOTE — H&P
[facility] ()  [id number] [place] [id number]  H&P and SEDATION/ANALGESIA     Patient demographics:  Date:   10/21/2022  Patient name: Sherin Tobias  YOB: 1950  Sex: male   MRN:   [id number]    Reason for admission or planned procedure:  Dual Chamber pacemaker implantation    Code Status: Full Code    Consent:The risk and benefits of dual chamber pacemaker implantation including pneumothorax, hemothorax, bleeding, vascular complications, infection, pericardial tamponade requiring emergency pericardiocentesis, MI, death, exposure to radiation, lead dislodgement requiring reposition, future operations for generator change or device revision or upgrade were discussed with the patient. He verbalized understanding of the discussion. His questions were answered. The patient wishes to proceed. Brief clinical summary:  72/M with symptomatic sinus bradycardia electively presents for a dual chamber pacemaker implantation. Preserved LVEF. Medical Hx: Sinus bradycardia, non-obstructive CAD, HPL, and splenomegaly. Past Medical History:  Past Medical History:   Diagnosis Date    Arthritis     ASHD (arteriosclerotic heart disease) 01/21/2013    Eructation 08/14/2014    GERD (gastroesophageal reflux disease)     Hyperlipidemia     Sepsis due to urinary tract infection ([address] 02/25/2022    Splenomegaly 09/15/2014       Past Surgical History:  Past Surgical History:   Procedure Laterality Date    APPENDECTOMY  09/22/2016    Dr. Rivera Mo    COLONOSCOPY  07/26/2016    Kristen Riddle  2013    2 stents lad    FINGER SURGERY          Allergies:    Allergies as of 10/21/2022    (No Known Allergies)     Additional information:       Medications     Current Facility-Administered Medications:     sodium chloride flush 0.9 % injection 5-40 mL, 5-40 mL, IntraVENous, 2 times per day, KEL Garcia CNP    sodium chloride flush 0.9 % injection 5-40 mL, 5-40 mL, IntraVENous, PRN, Manfred Acuna APRN - CNP    0.9 % sodium chloride infusion, , IntraVENous, PRN, KEL Jones CNP    ceFAZolin (ANCEF) 1000 mg in dextrose 5 % 50 mL IVPB (premix), 1,000 mg, IntraVENous, On Call to OR, KEL Jones CNP    vancomycin (VANCOCIN) 250 mg in sodium chloride 0.9 % 250 mL irrigation, 250 mg, Irrigation, Once, KEL Jones CNP  Prior to Admission medications    Medication Sig Start Date End Date Taking? Authorizing Provider   ibuprofen (ADVIL;MOTRIN) 200 MG tablet Take 200 mg by mouth every 6 hours as needed for Pain    Historical Provider, MD   aspirin 81 MG EC tablet Take 81 mg by mouth daily    Historical Provider, MD   omeprazole (PRILOSEC) 40 MG delayed release capsule Take 1 capsule by mouth in the morning. 8/1/22   Ronda Serrano MD   simvastatin (ZOCOR) 40 MG tablet Take 1 tablet by mouth nightly 4/25/22   Rolo Rodney MD   nitroGLYCERIN (NITROSTAT) 0.4 MG SL tablet DISSOLVE 1 TAB UNDER TONGUE EVERY 5 MINUTES AS NEEDED FOR CHEST PAIN. After third dose and still no relief, call 911 4/25/22   Ramonita Child MD   Multiple Vitamins-Minerals (THERAPEUTIC MULTIVITAMIN-MINERALS) tablet Take 1 tablet by mouth daily    Historical Provider, MD   hydroCHLOROthiazide (HYDRODIURIL) 25 MG tablet Take 1 tablet by mouth daily 3/1/22   Ramonita Child MD     Aspirin  [x] 81 mg  [] 325 mg  [] None  Antiplatelet drug therapy use last 5 days  [x]No []Yes  Coumadin Use Last 5 Days [x]No []Yes  Other anticoagulant use last 5 days  [x]No []Yes  Additional information: Per medical records       Vitals: There were no vitals filed for this visit. Physical Examination:   GENERAL: Alert and oriented. No distress. EYES: No pallor or icterus. ENT: No central cyanosis. VESSELS: No jugular venous distension or carotid bruits. HEART: Normal S1/S2. No murmur, rub or gallop.   LUNGS: Clear to auscultation. ABDOMEN: Soft and non-tender. EXTREMITIES: No lower extremity edema. Feet are warm. NEUROLOGICAL: Grossly intact. Procedure Plannd:   [] AF ablation [] Atrial Flutter ablation [] SVT ablation [] PVC/VT ablation [] EP study  [x] Pacemaker implantation [] AICD implantation [] BiV PM/ICD implantation   [] Generator change [] Loop recorder implantation [] Loop recorder explantation. [] Micra leadless pacemaker implantation. [] His bundle/Left bundle pacemaker implantation. [] Lead revision. [] Pocket Revision. [] PALLAVI. [] Cardioversion    []Other:       Planned Sedation/Analgesia:  [] General anesthesia. [x]Midazolam [x] Fentanyl [] Propofol [] Propofol with anesthesia team.    []Midazolam []Meperidine []Sublimaze []Morphine [] Diazepam    []Other:       ASA Classification  []1 []2 [x]3 []4 []5  Class 1: A normal healthy patient  Class 2: Pt with mild to moderate systemic disease  Class 3: Severe systemic disease or disturbance  Class 4: Severe systemic disorders that are already life threatening. Class 5: Moribund pt with little chances of survival, for more than 24 hours. Mallampati Airway Classification:   []1 []2 [x]3 []4    [x]Pre-procedure diagnostic studies complete and results available. Comment:    [x]Previous sedation/anesthesia experiences assessed. Comment:    [x]The patient is an appropriate candidate to undergo the planned procedure sedation and anesthesia. (Refer to nursing sedation/analgesia documentation record)  [x]Formulation and discussion of sedation/procedure plan, risks, and expectations with patient and/or responsible adult completed. [x]Patient examined immediately prior to the procedure.  (Refer to nursing sedation/analgesia documentation record)        Charlynne Severin, MD MD [facility]  Electronically signed 10/21/2022 at 9:28 AM

## 2022-10-21 NOTE — PROGRESS NOTES
0900  Pt admitted to  2E08 ambulatory for pacemaker implant . Pt NPO. Patient accompanied by spouse, Leonie.   Vital signs obtained. Assessment and data collection initiated. Oriented to room. Policies and procedures for 2E explained   All questions answered with no further questions at this time. Fall prevention and safety precautions discussed with patient. Care plan reviewed with patient and spouse. Patient and spouse verbalize understanding of the plan of care and contribute to goal setting.      1025 to procedure per bed

## 2022-10-21 NOTE — DISCHARGE INSTRUCTIONS
Activity     You should gradually return to your normal activities. For the first 4 weeks:         Do not lift more than 10 pounds         Do not swim, golf, bowl or vacuum          Do not raise your device side arm above your shoulder for 30 days         At your two-week follow-up visit, ask your doctor which  of the above activities you can resume         ALWAYS avoid any contact sports or hard blows to the chest or abdomen         Avoid sleeping on the left side and face down       Wound care      Remove outer dressing in 48 hours, leave steri strips on . Keep your incision dry for at least 7 days, and then you may shower. Sponge bathes around the device insertion site for the first week, keeping the incision dry. Do not apply any ointment, talc, or lotion to the incision. Do not scratch, rub or touch the incision with your hands     The steri-strips (tape strips) will be removed at your follow-up visit, if they have not yet peeled off on their own     Call your doctor immediately if your incision looks re, becomes swollen or tender, or begins to ooze fluid. Also, notify your doctor at once if you develop                   A temperature  greater than 100 degrees Fahrenheit. You must avoid:                Airport magnet security wants                Diathermy or other heart treatments                Arc or resistance welding                Electrical power generator plants                Electric cautery that is used for surgical procedures                Radio or television transmitting towers    Special Precautions: You must stay at arm's length from magnets of any kind       Cellular phones should be used on the ear opposite to your device. Do not keep cellular phones in a pocket over your pacemaker.          To avoid any interference with your device, you must pass through any security devices or teixeira (airports, department stores, and other public places) within 10 seconds. Microwave ovens, televisions, electric tools, and gardening equipment should not cause problems. If you have any questions about equipment not listed, call the  of your device or the Pacemaker/ICD clinic at [address] [place] [address]. General instructions      Always carry your device identification card with you. The device   will mail a permanent  identification card to you in 6-8 weeks. Keep your follow-up appointment with your doctor and the device clinic to be sure your device and the leads are working properly. Always take your medications at the times prescribed by your doctor. Always tell  medical personnel that you have a device. Memorize the name of the device company ex, [employer]      Follow-up appointments:     Make a follow-up appointment with you doctor 1-2 weeks after your implantation. If you have any problems or questions about your device, call the Pacemaker/ICD Clinic [phone]     Clinic hours Monday through Friday 8 am to 4 pm  IN CASE OF AN EMERGENCY, CALL 911           Pacemaker Placement: What to Expect at [address] [place] [address]     Pacemaker placement is surgery to put a pacemaker in your chest. This surgery may be done if you have bradycardia (a slow heart rate). Your doctor made a cut (incision) in your chest. The doctor put the pacemaker leads through the cut, into a large blood vessel, then into the heart. The doctor put the pacemaker under the skin of your chest and attached the leads to it. Your chest may be sore where the doctor made the cut. You also may have a bruise and mild swelling. These symptoms usually get better in 1 to 2 weeks. You may feel a hard ridge along the incision. This usually gets softer in the months after surgery. You may be able to see or feel the outline of the pacemaker under your skin. You may be able to go back to work or your usual routine 1 to 2 weeks after surgery.  But for at least a few weeks after the surgery, you will avoid vigorous physical activity that involves your upper body. Pacemaker batteries may last about 10 years. If the battery gets low, you will need to decide whether to have another surgery to replace the pacemaker. You'll need to take steps to safely use electric devices. Some of these devices can stop your pacemaker from working right for a short time. Check with your doctor about what to avoid and what to keep a short distance away from your pacemaker. For example, you will need to stay away from things with strong magnetic and electrical fields. An example is an MRI machine (unless your pacemaker is safe for an MRI). You can use a cell phone and other wireless devices, but keep them at least 6 inches away from your pacemaker. Many household and office electronics don't affect a pacemaker. This care sheet gives you a general idea about how long it will take for you to recover. But each person recovers at a different pace. Follow the steps below to get better as quickly as possible. How can you care for yourself at home? Activity    Rest when you feel tired. Be active. Walking is a good choice. Do not raise your arm (on the side of your body where the pacemaker is located) above shoulder level until your doctor says it's okay. This helps keep the pacemaker and leads in place while you heal. Your doctor may recommend gentle range-of-motion exercises for your shoulder. For at least 3 or 4 weeks, or for as long as your doctor says, avoid activities that strain your chest or upper arm muscles. This includes mopping floors or pushing a  or vacuum. It also includes swinging a golf club or tennis racquet or swimming. You may need to take about 1 to 2 weeks off from work. It depends on the type of work you do and how you feel. Ask your doctor when you can drive again. Ask your doctor when it is okay for you to have sex.    Diet    You can eat your normal diet. If your stomach is upset, try bland, low-fat foods like plain rice, broiled chicken, toast, and yogurt. Medicines    Your doctor will tell you if and when you can restart your medicines. The doctor will also give you instructions about taking any new medicines. If you stopped taking aspirin or some other blood thinner, your doctor will tell you when to start taking it again. Be safe with medicines. Read and follow all instructions on the label. If the doctor gave you a prescription medicine for pain, take it as prescribed. If you are not taking a prescription pain medicine, ask your doctor if you can take an over-the-counter medicine. Do not take aspirin, ibuprofen (Advil, Motrin), naproxen (Aleve), or other nonsteroidal anti-inflammatory drugs (NSAIDs) unless your doctor says it is okay. Incision care    If you have strips of tape on the incision, leave the tape on for a week or until it falls off. You may shower 24 to 48 hours after surgery. Pat the incision dry. Don't swim or take a bath for the first 2 weeks or until your doctor tells you it is okay. You will have a dressing over the incision. A dressing helps the incision heal and protects it. Your doctor will tell you how to take care of this. Other instructions    Keep a medical ID card with you at all times that says you have a pacemaker. The card should include the  and model information. Wear medical alert jewelry stating that you have a pacemaker. You can buy this at most drugstores. Tell all of your doctors, dentists, and other health professionals that you have a pacemaker before you have any test, procedure, or surgery. Ask your doctor for a list of electric devices that you might need to keep a short distance from your pacemaker. Check your pulse as directed by your doctor. Have your pacemaker checked as often as your doctor recommends.  In some cases, this may be done from your home. Your doctor will give you instructions about how to do this. Follow-up care is a key part of your treatment and safety. Be sure to make and go to all appointments, and call your doctor if you are having problems. It's also a good idea to know your test results and keep a list of the medicines you take. When should you call for help? Call 911 anytime you think you may need emergency care. For example, call if:    You passed out (lost consciousness). You have trouble breathing. Call your doctor now or seek immediate medical care if:    You are dizzy or light-headed, or you feel like you may faint. You have pain that does not get better after you take pain medicine. You hear an alarm or feel a vibration from your pacemaker. You have loose stitches, or your incision comes open. Bright red blood has soaked through the bandage over your incision. You have signs of infection, such as: Increased pain, swelling, warmth, or redness. Red streaks leading from the incision. Pus draining from the incision. A fever. You have hiccups often or for a long time. Watch closely for changes in your health, and be sure to contact your doctor if:    You have any problems with your pacemaker. Where can you learn more? Go to https://[employer]. org and sign in to your [employer] account. Enter G550 in the [facility] box to learn more about \"Pacemaker Placement: What to Expect at Home. \"     If you do not have an account, please click on the \"Sign Up Now\" link. Current as of: January 10, 2022               Content Version: 13.4  © 2006-2022 [employer]. Care instructions adapted under license by [facility] ([facility]). If you have questions about a medical condition or this instruction, always ask your healthcare professional. [place] [address] any warranty or liability for your use of this information.

## 2022-10-23 ENCOUNTER — HOSPITAL ENCOUNTER (EMERGENCY)
Age: 72
Discharge: HOME | End: 2022-10-23
Attending: EMERGENCY MEDICINE
Payer: MEDICARE

## 2022-10-23 VITALS
OXYGEN SATURATION: 96 % | DIASTOLIC BLOOD PRESSURE: 75 MMHG | RESPIRATION RATE: 24 BRPM | WEIGHT: 200 LBS | BODY MASS INDEX: 28.7 KG/M2 | SYSTOLIC BLOOD PRESSURE: 132 MMHG | HEART RATE: 64 BPM | TEMPERATURE: 98.5 F

## 2022-10-23 DIAGNOSIS — Z95.0 PRESENCE OF CARDIAC PACEMAKER: Primary | ICD-10-CM

## 2022-10-23 LAB
ANION GAP SERPL CALCULATED.3IONS-SCNC: 11 MEQ/L (ref 8–16)
ATYPICAL LYMPHOCYTES: ABNORMAL %
BASOPHILS # BLD: 1 %
BASOPHILS ABSOLUTE: 0.1 THOU/MM3 (ref 0–0.1)
BUN BLDV-MCNC: 21 MG/DL (ref 7–22)
CALCIUM SERPL-MCNC: 9 MG/DL (ref 8.5–10.5)
CHLORIDE BLD-SCNC: 101 MEQ/L (ref 98–111)
CO2: 27 MEQ/L (ref 23–33)
CREAT SERPL-MCNC: 0.9 MG/DL (ref 0.4–1.2)
EKG ATRIAL RATE: 64 BPM
EKG P AXIS: 45 DEGREES
EKG P-R INTERVAL: 198 MS
EKG Q-T INTERVAL: 382 MS
EKG QRS DURATION: 102 MS
EKG QTC CALCULATION (BAZETT): 394 MS
EKG R AXIS: 37 DEGREES
EKG T AXIS: 50 DEGREES
EKG VENTRICULAR RATE: 64 BPM
ELLIPTOCYTES: ABNORMAL
EOSINOPHIL # BLD: 3.1 %
EOSINOPHILS ABSOLUTE: 0.2 THOU/MM3 (ref 0–0.4)
ERYTHROCYTE [DISTWIDTH] IN BLOOD BY AUTOMATED COUNT: 14.4 % (ref 11.5–14.5)
ERYTHROCYTE [DISTWIDTH] IN BLOOD BY AUTOMATED COUNT: 42.7 FL (ref 35–45)
GFR SERPL CREATININE-BSD FRML MDRD: > 60 ML/MIN/1.73M2
GLUCOSE BLD-MCNC: 86 MG/DL (ref 70–108)
HCT VFR BLD CALC: 37.4 % (ref 42–52)
HEMOGLOBIN: 12.1 GM/DL (ref 14–18)
IMMATURE GRANS (ABS): 0.01 THOU/MM3 (ref 0–0.07)
IMMATURE GRANULOCYTES: 0.2 %
LYMPHOCYTES # BLD: 46.2 %
LYMPHOCYTES ABSOLUTE: 2.7 THOU/MM3 (ref 1–4.8)
MCH RBC QN AUTO: 26.9 PG (ref 26–33)
MCHC RBC AUTO-ENTMCNC: 32.4 GM/DL (ref 32.2–35.5)
MCV RBC AUTO: 83.1 FL (ref 80–94)
MONOCYTES # BLD: 5.4 %
MONOCYTES ABSOLUTE: 0.3 THOU/MM3 (ref 0.4–1.3)
NUCLEATED RED BLOOD CELLS: 0 /100 WBC
OSMOLALITY CALCULATION: 279.8 MOSMOL/KG (ref 275–300)
PLATELET # BLD: 113 THOU/MM3 (ref 130–400)
PLATELET ESTIMATE: ABNORMAL
PMV BLD AUTO: 9.6 FL (ref 9.4–12.4)
POTASSIUM REFLEX MAGNESIUM: 4 MEQ/L (ref 3.5–5.2)
PRO-BNP: 139.2 PG/ML (ref 0–900)
RBC # BLD: 4.5 MILL/MM3 (ref 4.7–6.1)
SCAN OF BLOOD SMEAR: NORMAL
SEG NEUTROPHILS: 44.1 %
SEGMENTED NEUTROPHILS ABSOLUTE COUNT: 2.6 THOU/MM3 (ref 1.8–7.7)
SODIUM BLD-SCNC: 139 MEQ/L (ref 135–145)
TROPONIN T: < 0.01 NG/ML
WBC # BLD: 5.8 THOU/MM3 (ref 4.8–10.8)

## 2022-10-23 PROCEDURE — 93005 ELECTROCARDIOGRAM TRACING: CPT | Performed by: STUDENT IN AN ORGANIZED HEALTH CARE EDUCATION/TRAINING PROGRAM

## 2022-10-23 PROCEDURE — 80048 BASIC METABOLIC PNL TOTAL CA: CPT

## 2022-10-23 PROCEDURE — 99284 EMERGENCY DEPT VISIT MOD MDM: CPT

## 2022-10-23 PROCEDURE — 36415 COLL VENOUS BLD VENIPUNCTURE: CPT

## 2022-10-23 PROCEDURE — 93010 ELECTROCARDIOGRAM REPORT: CPT | Performed by: INTERNAL MEDICINE

## 2022-10-23 PROCEDURE — 84484 ASSAY OF TROPONIN QUANT: CPT

## 2022-10-23 PROCEDURE — 83880 ASSAY OF NATRIURETIC PEPTIDE: CPT

## 2022-10-23 PROCEDURE — 85025 COMPLETE CBC W/AUTO DIFF WBC: CPT

## 2022-10-23 NOTE — ED TRIAGE NOTES
Pt in through ED lobby with wife. He had a pacemaker placed on Friday. Today they checked his pulse with a pulse ox and his home BP cuff and there were times it was in the 40s. They tried to contact [employer], but she could not get a hold of them He has been asymptomatic today.

## 2022-10-23 NOTE — ED NOTES
[employer] pacemaker interrogated. Patient tolerated well with no concerns. Wife at bedside.       Yaneth Tijerina RN  10/23/22 [id number]

## 2022-10-23 NOTE — ED NOTES
Assumed care at this time. Received report from [facility]. Pt resting in bed alert and denies any needs. No distress noted.  Pt stable at this time     Janet Riley [facility]  10/23/22 1935

## 2022-10-23 NOTE — ED PROVIDER NOTES
[facility] ENCOUNTER          Pt Name: Angi Orozco  MRN: [id number]  [place] 1950  Date of evaluation: 10/23/2022  Treating Resident Physician: Anahy Parsons MD  Supervising Physician: Jeane Maloney COMPLAINT       Chief Complaint   Patient presents with    Pacemaker Problem     History obtained from the patient. HISTORY OF PRESENT ILLNESS    HPI  Angi Orozco is a 74 y.o. male who presents to the emergency department for evaluation of a smoker. Pacemaker was placed 5 days ago without problem. Patient was told to occasionally check his pulse on a pulse ox to make sure that the pacer is capturing. Today when he checked it, it read as 39. Denies any symptoms. The patient has no other acute complaints at this time. REVIEW OF SYSTEMS   Review of Systems   Constitutional: Negative. HENT: Negative. Respiratory: Negative. Negative for cough, choking, chest tightness, shortness of breath and wheezing. Cardiovascular: Negative. Negative for chest pain, palpitations and leg swelling. Gastrointestinal: Negative. Genitourinary: Negative. Musculoskeletal: Negative. Skin: Negative. Neurological: Negative. Hematological: Negative. PAST MEDICAL AND SURGICAL HISTORY     Past Medical History:   Diagnosis Date    Arthritis     ASHD (arteriosclerotic heart disease) 01/21/2013    Eructation 08/14/2014    GERD (gastroesophageal reflux disease)     Hyperlipidemia     Sepsis due to urinary tract infection ([facility] [address] 02/25/2022    Splenomegaly 09/15/2014     Past Surgical History:   Procedure Laterality Date    APPENDECTOMY  09/22/2016    Dr. Crews Patch    COLONOSCOPY  07/26/2016    Kristen Riddle  2013    2 stents lad    FINGER SURGERY           MEDICATIONS   No current facility-administered medications for this encounter.     Current Outpatient Medications:     ibuprofen (ADVIL;MOTRIN) 200 MG tablet, Take 200 mg by mouth every 6 hours as needed for Pain, Disp: , Rfl:     aspirin 81 MG EC tablet, Take 81 mg by mouth daily, Disp: , Rfl:     omeprazole (PRILOSEC) 40 MG delayed release capsule, Take 1 capsule by mouth in the morning., Disp: 90 capsule, Rfl: 2    simvastatin (ZOCOR) 40 MG tablet, Take 1 tablet by mouth nightly, Disp: 90 tablet, Rfl: 3    nitroGLYCERIN (NITROSTAT) 0.4 MG SL tablet, DISSOLVE 1 TAB UNDER TONGUE EVERY 5 MINUTES AS NEEDED FOR CHEST PAIN. After third dose and still no relief, call 911, Disp: 25 tablet, Rfl: 3    Multiple Vitamins-Minerals (THERAPEUTIC MULTIVITAMIN-MINERALS) tablet, Take 1 tablet by mouth daily, Disp: , Rfl:     hydroCHLOROthiazide (HYDRODIURIL) 25 MG tablet, Take 1 tablet by mouth daily, Disp: 90 tablet, Rfl: 3      SOCIAL HISTORY     Social History     Social History Narrative    Not on file     Social History     Tobacco Use    Smoking status: Never    Smokeless tobacco: Never   Vaping Use    Vaping Use: Never used   Substance Use Topics    Alcohol use:  Yes     Alcohol/week: 1.0 standard drink     Types: 1 Glasses of wine per week     Comment: socially    Drug use: No         ALLERGIES   No Known Allergies      FAMILY HISTORY     Family History   Problem Relation Age of Onset    Heart Disease Father 50        MI    Stroke Father     Heart Disease Brother 61        STENTS    Arthritis Sister     Heart Disease Brother     Heart Disease Brother     No Known Problems Brother     No Known Problems Brother     No Known Problems Brother     No Known Problems Sister     No Known Problems Sister     No Known Problems Sister          PREVIOUS RECORDS   Previous records reviewed: Medical, past surgical, medications, allergies        PHYSICAL EXAM     ED Triage Vitals   BP Temp Temp Source Heart Rate Resp SpO2 Height Weight   10/23/22 1827 10/23/22 1827 10/23/22 1827 10/23/22 1827 10/23/22 1827 10/23/22 1827 -- 10/23/22 1842   (!) 158/74 98.5 °F (36.9 °C) Oral 75 18 96 %  200 lb (90.7 kg)     Initial vital signs and nursing assessment reviewed and normal. Body mass index is 28.7 kg/m². Pulsoximetry is normal per my interpretation. Additional Vital Signs:  Vitals:    10/23/22 2027   BP: 132/75   Pulse: 64   Resp: 24   Temp:    SpO2: 96%       Physical Exam  Constitutional:       Appearance: Normal appearance. HENT:      Head: Normocephalic and atraumatic. Right Ear: External ear normal.      Left Ear: External ear normal.      Nose: Nose normal.      Mouth/Throat:      Mouth: Mucous membranes are moist.      Pharynx: Oropharynx is clear. Eyes:      Extraocular Movements: Extraocular movements intact. Conjunctiva/sclera: Conjunctivae normal.      Pupils: Pupils are equal, round, and reactive to light. Cardiovascular:      Pulses: Normal pulses. Heart sounds: Normal heart sounds. Pulmonary:      Effort: Pulmonary effort is normal. No respiratory distress. Breath sounds: Normal breath sounds. No wheezing or rales. Abdominal:      General: Abdomen is flat. Bowel sounds are normal. There is no distension. Palpations: Abdomen is soft. Tenderness: There is no abdominal tenderness. Musculoskeletal:      Right lower leg: No edema. Left lower leg: No edema. Skin:     General: Skin is warm and dry. Capillary Refill: Capillary refill takes less than 2 seconds. Findings: Bruising present. Comments: pacemaker site clean dry intact, does have bruising over the medial left upper arm. Neurological:      General: No focal deficit present. Mental Status: He is alert. Gait: Gait normal.            MEDICAL DECISION MAKING   Initial Assessment: This is a 57-year-old male, past medical history of symptomatic bradycardia, frequent PVCs, presenting after pacemaker placement due to pulse oximeter reading as 45/min.   Physical exam significant for well-appearing older gentleman in no acute distress, pacemaker site clean dry intact, does have bruising over the medial left upper arm. Plan:   Interrogation of pacemaker shows frequent PVCs. After speaking with [employer] tech, apparently the PVCs are clustered close enough together to normal inherently atrial paced beats that the pulse ox typically only reads it as 1. Therefore those times when he was experiencingBe that the pulse ox typically only reads it as 1. Therefore those times when he had readings in the 40s, the actual conducted ventricular beats were in the upper 80s to 90s. No runs of V. tach were seen. No runs of bradycardia were seen. Atrial pacer beats on EKG present  Labs grossly unremarkable. No concern for adventitious causes of PVCs. Discharged home with strict return precautions, follow-up. ED RESULTS   Laboratory results:  Labs Reviewed   CBC WITH AUTO DIFFERENTIAL - Abnormal; Notable for the following components:       Result Value    RBC 4.50 (*)     Hemoglobin 12.1 (*)     Hematocrit 37.4 (*)     Platelets 466 (*)     Monocytes Absolute 0.3 (*)     All other components within normal limits   BASIC METABOLIC PANEL W/ REFLEX TO MG FOR LOW K   TROPONIN   BRAIN NATRIURETIC PEPTIDE   GLOMERULAR FILTRATION RATE, ESTIMATED   ANION GAP   OSMOLALITY   SCAN OF BLOOD SMEAR       Radiologic studies results:  No orders to display       ED Medications administered this visit: Medications - No data to display      ED COURSE         Strict return precautions and follow up instructions were discussed with the patient prior to discharge, with which the patient agrees. MEDICATION CHANGES     Discharge Medication List as of 10/23/2022  8:19 PM            FINAL DISPOSITION     Final diagnoses:   Presence of cardiac pacemaker     Condition: condition: good  Dispo: Discharge to home      This transcription was electronically signed.  Parts of this transcriptions may have been dictated by use of voice recognition software and electronically transcribed, and parts may have been transcribed with the assistance of an ED scribe. The transcription may contain errors not detected in proofreading. Please refer to my supervising physician's documentation if my documentation differs.     Electronically Signed: Kelsey Hopkins MD, 10/24/22, 3:15 AM          Kelsey Hopkins MD  Resident  10/24/22 4146

## 2022-10-24 ASSESSMENT — ENCOUNTER SYMPTOMS
CHEST TIGHTNESS: 0
RESPIRATORY NEGATIVE: 1
COUGH: 0
SHORTNESS OF BREATH: 0
GASTROINTESTINAL NEGATIVE: 1
WHEEZING: 0
CHOKING: 0

## 2022-10-24 NOTE — TELEPHONE ENCOUNTER
Call to patient and wife. Going to check with [facility] on cost and will get back with us on what scripts need to be sent where.

## 2022-10-24 NOTE — ED PROVIDER NOTES
[address], Pt Name: Leota Moritz  MRN: [id number]  [place] 1950  Date of evaluation: 9/12/20      I personally saw and examined the patient. I have reviewed and agree with the Resident findings, including all diagnostic interpretations and treatment plans as written. I was present for the key portion of any procedures performed and the inclusive time noted in any critical care statement. History: This patient was seen with Estelita Lamar, resident physician. The patient was getting electronic readings at home that his pulse was in the 40s. He had recently had a pacemaker placed so that should not really be possible with the rate being set higher than that. So they came in for evaluation. However when the pacemaker was interrogated it appears to have never dropped down that low. The patient never had any symptoms today. There is no weakness no chest pain no dyspnea.   He basically feels normal.  Following the interrogation he appears to be stable for discharge    Diagnosis: Feared complaint of bradycardia not found on interrogation  discharged                                Rocio Kessler DO  10/24/22 [id number]

## 2022-10-24 NOTE — TELEPHONE ENCOUNTER
Call to wife. Spoke to her about bruising, home monitoring, and PVCs. Went to ER over the weekend due to low heart rates on monitors. [facility] was sent. In Media tab. Chose [facility], aware once they get that working, we are able to watch patient from home. Wondering about medication to help with PVCs? Stated you, Dr Orly Adame, talked about possibly starting Toprol?

## 2022-10-24 NOTE — TELEPHONE ENCOUNTER
----- Message from Sara Morales sent at 10/24/2022  9:18 AM EDT -----  Regarding: medication  Becky Weiss was told he would prob need to be on medication for PVcs after he got the pacemaker. Will that script come from Dr Sharon Chisholm or Dr Dawson Javier? Will pacer clinic take care of that and wait til next monday or what is the process?  thanks

## 2022-10-24 NOTE — TELEPHONE ENCOUNTER
Johanne Tamez \"Bill\"  P [facility] Clinical Support Pool (supporting Jimbo Plata MD) 11 hours ago (8:01 PM)       Just a follow up to previous comment about bruising. Sent message as I thought possibly messages would be monitored. Bruising has gotten better somewhat. A little less pain. Heart Rate today was sporadic 44 to 70 at times. Called office and [employer] and neither had after hours service. Was told to visit ED. Did interrigation sent to [facility]. HR actually was 85-90 but not picked up on pulse ox or manual check on on BP cuff. ED Dr said caused by [facility]. Just FYI. Think [facility] is better. Will follow up with pacer clinic oct 31    Johanne Tamez \"Bill\"  P [facility] Clinical Support Pool (supporting Jimbo Plata MD) 2 days ago       Was told some bruising was expected. Will attach pic. Not hard or real swollen but wondering if ok. No active bleeding at site.  Very very sore

## 2022-10-31 ENCOUNTER — NURSE ONLY (OUTPATIENT)
Dept: CARDIOLOGY CLINIC | Age: 72
End: 2022-10-31
Payer: MEDICARE

## 2022-10-31 DIAGNOSIS — Z95.0 PACEMAKER: Primary | ICD-10-CM

## 2022-10-31 PROCEDURE — 93280 PM DEVICE PROGR EVAL DUAL: CPT | Performed by: NUCLEAR MEDICINE

## 2022-10-31 NOTE — PROGRESS NOTES
Dr Pedro Luis Cedillo pt   [employer] dual  pacemaker initial check in office after implant    Presents in apvs  Underlying asvs 40's    A paced 93.2%  V paced 0.1%    Battery 12.2 yrs remaining      Aair<=>dddr     P waves 3.4  Rv waves 7.1    Atrial imepence 437    Vent impedence 494    Atrial threshold 0.5 @ 0.4    Vent threshold 0.75 @ 0.4    Atrial and vent amplitudes 3.5 @ 0.4  No episodes       Incision line with steri strips removed and cleaned with chlora prep  1 suture removed without diff    New steri strips applied     Reviewed all post op instructions with pt and spouse      Handouts given. Pt has [facility] on phone to send     Wife telling me that they are leaving to go to [place] for the winter and they will not be coming back to [facility] for their 3 mth chronic value appt in office. Told them that they will need to find a cardiologist with a device clinic so they can have his device checked in office the end of jan or the first week of feb. Wife concerned about this pts sob. He told me he was sob prior to procedure but his sob ins no worse. He said they were told that his sob would go away after he had the pacemaker implanted and it did not. Also concerned about his blood pressures and his medications. I told them that they would have to see one of dr brock's CNP'S TO REVIEW HIS MEDS AND HIS BLOOD PRESSURES. WIFE SAID HE DOES GET DIZZY AT TIMES IF HE STANDS UP TOO FAST .

## 2022-11-08 ENCOUNTER — OFFICE VISIT (OUTPATIENT)
Dept: CARDIOLOGY CLINIC | Age: 72
End: 2022-11-08
Payer: MEDICARE

## 2022-11-08 VITALS
DIASTOLIC BLOOD PRESSURE: 82 MMHG | HEART RATE: 64 BPM | WEIGHT: 201.2 LBS | HEIGHT: 70 IN | SYSTOLIC BLOOD PRESSURE: 122 MMHG | BODY MASS INDEX: 28.8 KG/M2

## 2022-11-08 DIAGNOSIS — I25.119 ATHEROSCLEROSIS OF NATIVE CORONARY ARTERY OF NATIVE HEART WITH ANGINA PECTORIS (HCC): ICD-10-CM

## 2022-11-08 DIAGNOSIS — R00.1 BRADYCARDIA: ICD-10-CM

## 2022-11-08 DIAGNOSIS — I25.10 ASHD (ARTERIOSCLEROTIC HEART DISEASE): ICD-10-CM

## 2022-11-08 DIAGNOSIS — R06.02 SHORTNESS OF BREATH: ICD-10-CM

## 2022-11-08 DIAGNOSIS — I10 ESSENTIAL HYPERTENSION: ICD-10-CM

## 2022-11-08 DIAGNOSIS — Z95.0 PACEMAKER: Primary | ICD-10-CM

## 2022-11-08 PROCEDURE — 3078F DIAST BP <80 MM HG: CPT | Performed by: NURSE PRACTITIONER

## 2022-11-08 PROCEDURE — 3074F SYST BP LT 130 MM HG: CPT | Performed by: NURSE PRACTITIONER

## 2022-11-08 PROCEDURE — 1123F ACP DISCUSS/DSCN MKR DOCD: CPT | Performed by: NURSE PRACTITIONER

## 2022-11-08 PROCEDURE — 99214 OFFICE O/P EST MOD 30 MIN: CPT | Performed by: NURSE PRACTITIONER

## 2022-11-08 NOTE — PATIENT INSTRUCTIONS
Increase the Metoprolol to 37.5 mg (from 25 mg) twice a day. Hold the extra half a tablet if blood pressure less than 105 as top number or if not feeling well. Continue to follow post pacemaker insertion guidelines. Follow-up with Cardiologist in [place] as planned. Call our office with any questions or concerns if need. Continue other current medications as prescribed. Follow-up with your PCP as scheduled. Follow-up with Dr. Justa Corbin upon return from [place] as scheduled or sooner if need.

## 2022-11-08 NOTE — PROGRESS NOTES
[address] [place] [address]  Dept: [phone]  Dept Fax: [phone]  Loc: [phone]    Visit Date: 12/7/2022    Primary cardiologist: Dr. Dawson Javier  EP: Dr. Jael Bañuelos is a 74 y.o. male who presents today for: Follow-up status post dual pacer device implant of 10/21/2022    Chief Complaint   Patient presents with    Check-Up    Shortness of Breath     Seen in the office on 10/12/2022 as new patient for Dr. Sharon Chisholm regarding his bradycardia and evaluation for pacemaker implant. Per office note:  Impression:  Symptomatic sinus bradycardia. Premature atrial complexes, burden 6%. Preserved LVEF. Nonoperative coronary artery disease. Hyperlipidemia. Assessment And Recommendations: The patient does not have any reversible cause to explain his sinus bradycardia. He has been symptomatic for at least 6 months. He has preserved LVEF and no significant coronary artery disease on coronary angiogram.  He also has indication for beta-blockers for management of his major ventricular complexes. I think he will be benefited by a dual-chamber pacemaker implantation. Discussed my diagnosis and management options. He wishes to proceed. Last seen in the office per Dr. Dawson Javier on 8/17/22. Per office note:  Continues to have low HR  In the 40s  Holter with average Hr 50   No pauses  Some dyspnea  More than usual  Some chest pain  Cath 2021  Mild CAD  Does exercise  Some fatigue  On statins for hyperlipidemia  Assessment:    Diagnosis Orders   1. Bradycardia          2. Shortness of breath          As above  Chronic bradycardia   ? ? The cause of his symptoms  Cath 2021 was with mild CAD  Plan:  No follow-ups on file. Discussed  No clear indication for a pacer  Unless having issues with chronotropic cardiac issues   ? ?  Angina   Vs other causes  Consider a stress test to assess HR response   Vs refer to EP for opinion Vs linq   Continue risk factor modification and medical management       HPI:  HPI  Past Medical History:   Diagnosis Date    Arthritis     ASHD (arteriosclerotic heart disease) 01/21/2013    Eructation 08/14/2014    GERD (gastroesophageal reflux disease)     Hyperlipidemia     Sepsis due to urinary tract infection ([facility] [address] 02/25/2022    Splenomegaly 09/15/2014      Past Surgical History:   Procedure Laterality Date    APPENDECTOMY  09/22/2016    Dr. Ann Borne    COLONOSCOPY  07/26/2016    ,   [address] [facility] STENT PLACEMENT  2013    2 stents lad    FINGER SURGERY       Family History   Problem Relation Age of Onset    Heart Disease Father 50        MI    Stroke Father     Heart Disease Brother 61        STENTS    Arthritis Sister     Heart Disease Brother     Heart Disease Brother     No Known Problems Brother     No Known Problems Brother     No Known Problems Brother     No Known Problems Sister     No Known Problems Sister     No Known Problems Sister      Social History     Tobacco Use    Smoking status: Never    Smokeless tobacco: Never   Substance Use Topics    Alcohol use: Yes     Alcohol/week: 1.0 standard drink     Types: 1 Glasses of wine per week     Comment: socially      Current Outpatient Medications   Medication Sig Dispense Refill    metoprolol tartrate (LOPRESSOR) 25 MG tablet Take 1.5 tablets by mouth 2 times daily 180 tablet 3    metoprolol tartrate (LOPRESSOR) 25 MG tablet Take 1 tablet by mouth 2 times daily 60 tablet 0    ibuprofen (ADVIL;MOTRIN) 200 MG tablet Take 200 mg by mouth every 6 hours as needed for Pain      aspirin 81 MG EC tablet Take 81 mg by mouth daily      omeprazole (PRILOSEC) 40 MG delayed release capsule Take 1 capsule by mouth in the morning. 90 capsule 2    simvastatin (ZOCOR) 40 MG tablet Take 1 tablet by mouth nightly 90 tablet 3    nitroGLYCERIN (NITROSTAT) 0.4 MG SL tablet DISSOLVE 1 TAB UNDER TONGUE EVERY 5 MINUTES AS NEEDED FOR CHEST PAIN.  After third dose and still no relief, call 911 25 tablet 3    Multiple Vitamins-Minerals (THERAPEUTIC MULTIVITAMIN-MINERALS) tablet Take 1 tablet by mouth daily      hydroCHLOROthiazide (HYDRODIURIL) 25 MG tablet Take 1 tablet by mouth daily 90 tablet 3     No current facility-administered medications for this visit. No Known Allergies  Health Maintenance   Topic Date Due    Hepatitis C screen  Never done    Lipids  03/29/2023    Depression Screen  04/11/2023    Annual Wellness Visit (AWV)  04/12/2023    DTaP/Tdap/Td vaccine (3 - Td or Tdap) 10/23/2028    Colorectal Cancer Screen  08/02/2031    Flu vaccine  Completed    Shingles vaccine  Completed    Pneumococcal 65+ years Vaccine  Completed    COVID-19 Vaccine  Completed    Hepatitis A vaccine  Aged Out    Hib vaccine  Aged Out    Meningococcal (ACWY) vaccine  Aged Out     Today's visit:   Subjective:  Still feeling short of breath when walks 1/4 or 1/2 mile or picks up sticks in yard - more strenuous activity  Little light headed if changes position quickly  No chest pain or pressure; little tightness in chest with the shortness of breath   Heading to [facility]  Has cardiologist in [facility] - planning to have pacer check end of January  No swelling feet or ankles - had little prior to pacemaker  Mild soreness along pacer site  Tendonititis L shoulder      Subjective:  Review of Systems  General:   No fever, no chills, some fatigue; no weight loss  Pulmonary:    some dyspnea, no wheezing  Cardiac:    Denies recent chest pain,   GI:     No nausea or vomiting, no abdominal pain  Neuro:      No dizziness or light headedness, except as above  Musculoskeletal:  No recent active issues; monitoring PPM insertion restrictions.  L chest incision healing well; little discomfort  Extremities:   No edema, no obvious claudication     Objective:  Physical Exam  /82   Pulse 64   Ht 5' 10\" (1.778 m)   Wt 201 lb 3.2 oz (91.3 kg)   BMI 28.87 kg/m²     General:   Well developed, well nourished, appears well  Lungs:    Clear to auscultation with good aeration  Heart:    Normal S1 S2, Slight murmur. no rubs, no gallops  Abdomen:   Soft, non tender, positive bowel sounds  Extremities:   No edema, no cyanosis, good peripheral pulses  Neurological:   Awake, alert, oriented. No obvious focal deficits  Musculoskelatal:  No obvious deformities. PPM site L lateral chest wall incision edges well approximated without erythema or induration. No drainage or underlying hematoma. Diagnostics:  EK-OCT-2022 18:23:27 [facility] ROUTINE RECORD  Atrial-paced rhythm  Abnormal ECG  When compared with ECG of 21-OCT-2022 13:16,  Electronic atrial pacemaker has replaced Electronic ventricular pacemaker  Confirmed by Jessica العلي MD, Nabeel Mixon ([id number]) on 10/23/2022 11:38:01 PM    Event Monitor: 22   EVENT MONITOR   PATIENT NAME: Tomasa Osgood                  :        1950  MED REC NO:   [id number]                           ROOM:  ACCOUNT NO:   [de-identified]                           ADMIT DATE: 2022  PROVIDER:     Tello Ma M.D.     TEST TYPE:  Event monitor. CLINICAL HISTORY AND INDICATION:  This patient with bradycardia. EVENT MONITOR DESCRIPTION:  Event monitor was attached to the patient  between 2022 to 2022. EVENT MONITOR FINDINGS:  Baseline rhythm showed sinus rhythm with  frequent premature ventricular beats and episodes of ventricular  couplets and triplets as well as ventricular bigeminy occurring on  multiple occasions. Short three to four beats of  nonsustained V-tach was noted, no pauses. CONCLUSION:  1. Sinus rhythm with very heavy burden premature ventricular beats. 2.  Several episodes of ventricular bigeminy and episodes of ventricular  couplets and triplets with short nonsustained ventricular tachycardia. 3.  Abnormal event monitor.       Samir Reyes M.D.   D: 2022    10/21/22:         Show:Clear all  [x]Written[x]Templated[]Copied    Added by:  [x]Sharri Ramires MD    []Cindy for details  Brief Postoperative Note     Date:                                10/21/2022  Patient name:  Ronal Cheung  YOB: 1950  Sex: male   MRN:                                [id number]     PCP: Marco Burgos MD      Procedure: Dual Chamber pacemaker implantation     Pre-Op Diagnosis: Sick sinus syndrome     Post-Op Diagnosis: Same     Surgeon: Spring Chacon MD, [facility], [facility], [facility]     Recommendations:  See Epic orders. Bed rest for 2 hours. Keep pocket site dry. No shower for 7 days ((sponge bath and tub bath OK). ICE packs locally. Monitor site for bleeding or swelling. Pressure dressing x 24 hours. Chest x-ray PA and lateral views. Follow up in device clinic in 1 week. Electronically signed by Fritz Swanson MD, Pamela Myers on 10/21/2022 at 12:22 PM               Assessment:      Diagnosis Orders   1. Pacemaker        2. Bradycardia        3. Shortness of breath        4. ASHD (arteriosclerotic heart disease)        5. Essential hypertension        6. Atherosclerosis of native coronary artery of native heart with angina pectoris [facility])        As above  Cath 2021 was with mild CAD  Dual Chamber pacemaker implantation for SSS 10/21/22 per Dr. Tao Book  Doing fairly well; still with little dyspnea with activity - HR seems faster with activity - ? Tachy not adequately controlled    Plan:  Return in about 5 months (around 4/24/2023), or if symptoms worsen or fail to improve, for Dr. Lisa Schwartz. Increase the Metoprolol to 37.5 mg (from 25 mg) twice a day. Hold the extra half a tablet if blood pressure less than 105 as top number or if not feeling well. Continue to follow post pacemaker insertion guidelines. Follow-up with Cardiologist in [place] as planned. Call our office with any questions or concerns if need. Continue other current medications as prescribed.     Follow-up with your PCP as scheduled. Follow-up with Dr. Marlene Sandra upon return from [place] as scheduled or sooner if need. Continue risk factor modification and medical managementThank you for allowing me to participate in the care of your patient. Please don't hesitate to contact me regarding any further issues related to the patient care    Orders Placed:  No orders of the defined types were placed in this encounter. Medications Prescribed:  Orders Placed This Encounter   Medications    metoprolol tartrate (LOPRESSOR) 25 MG tablet     Sig: Take 1.5 tablets by mouth 2 times daily     Dispense:  180 tablet     Refill:  3     Discussed use, benefit, and side effects of prescribed medications. All patient questions answered. Pt voicedunderstanding. Instructed to continue current medications, diet and exercise. Continue risk factor modification and medical management. Patient agreed with treatment plan. Follow up as directed.     Electronically signedby KEL Lyle CNP on 12/7/2022 at 5:29 AM

## 2022-11-15 ENCOUNTER — TELEPHONE (OUTPATIENT)
Dept: CARDIOLOGY CLINIC | Age: 72
End: 2022-11-15

## 2022-11-15 NOTE — TELEPHONE ENCOUNTER
Spoke to the wife.  I did make them a download schedule and mailed it to them at [phone] , [address] [address] [facility] , [address]

## 2022-12-12 RX ORDER — SIMVASTATIN 40 MG
40 TABLET ORAL NIGHTLY
Qty: 90 TABLET | Refills: 1 | Status: SHIPPED | OUTPATIENT
Start: 2022-12-12

## 2022-12-12 RX ORDER — HYDROCHLOROTHIAZIDE 25 MG/1
25 TABLET ORAL DAILY
Qty: 90 TABLET | Refills: 1 | Status: SHIPPED | OUTPATIENT
Start: 2022-12-12

## 2022-12-22 ENCOUNTER — TELEPHONE (OUTPATIENT)
Dept: CARDIOLOGY CLINIC | Age: 72
End: 2022-12-22

## 2022-12-22 NOTE — TELEPHONE ENCOUNTER
----- Message from Sara Morales sent at 12/22/2022  2:57 PM EST -----  Regarding: Follow up  [facility] AT [employer] [facility] you dont mind a question for opinion. Mathew Mockami visited Cardiologist down here in [place] to get scheduled for [address] [facility]. Willma Pack continues to be somewhat short of breath. No worse and maybe somewhat better than when seeing you. No chest pain other than tightness. This also is no different and present before last stress and cardiac cath. Dr Dawson Javier had said at cath that stents were ok and cause of chest tightness maybe due to small vessels. Doctor here mentioned adjusting his high end of his pacer setting and maybe this would help. The Cardiologist here suggested doing a stress and echo while down here since he hadnt had one since 4/21. My thought is to wait til we got home and schedule that soon after getting home the end of march. Thoughts? Willma Pack has increased dizziness when standing since upping metotoprolol. The Dr here said to leave the dose alone cause he needs that for ventricular rhythm but to cut his water pill in half. That kind of doesnt make sense to me since he has daily coughing and some wheezing. Of course wheezing not present while seeing  this .  Terry Baker are your thoughts.  ?   Thanks for your help

## 2022-12-27 NOTE — TELEPHONE ENCOUNTER
Please see My Chart Message:     Durenda Draft \"Bill\"  P [facility] Clinical Staff (supporting Hempfling, Rena HUA, APRN - CNP) 7 minutes ago (3:13 PM)     WS  Thank you Rena. . My thoughts same. I think waiting for stress and echo good idea. Could we get stress and echo scheduled for beginning of April when we return from [place] to avoid delays? Down here you dont know the Drs or who they send you to , or peocedures they suggest etc.    thanks for your follow up!

## 2023-01-23 RX ORDER — HYDROCHLOROTHIAZIDE 25 MG/1
25 TABLET ORAL DAILY
Qty: 90 TABLET | Refills: 0 | Status: SHIPPED | OUTPATIENT
Start: 2023-01-23

## 2023-01-23 RX ORDER — SIMVASTATIN 40 MG
40 TABLET ORAL NIGHTLY
Qty: 90 TABLET | Refills: 0 | Status: SHIPPED | OUTPATIENT
Start: 2023-01-23

## 2023-03-31 RX ORDER — HYDROCHLOROTHIAZIDE 25 MG/1
25 TABLET ORAL DAILY
Qty: 90 TABLET | Refills: 0 | Status: SHIPPED | OUTPATIENT
Start: 2023-03-31

## 2023-04-06 ENCOUNTER — HOSPITAL ENCOUNTER (OUTPATIENT)
Age: 73
Discharge: HOME | End: 2023-04-06
Payer: OTHER GOVERNMENT

## 2023-04-06 ENCOUNTER — HOSPITAL ENCOUNTER (OUTPATIENT)
Dept: GENERAL RADIOLOGY | Age: 73
Discharge: HOME | End: 2023-04-06
Payer: OTHER GOVERNMENT

## 2023-04-06 DIAGNOSIS — R06.02 SOB (SHORTNESS OF BREATH): ICD-10-CM

## 2023-04-06 PROCEDURE — 71046 X-RAY EXAM CHEST 2 VIEWS: CPT

## 2023-04-18 PROCEDURE — 93296 REM INTERROG EVL PM/IDS: CPT | Performed by: NUCLEAR MEDICINE

## 2023-04-18 PROCEDURE — 93294 REM INTERROG EVL PM/LDLS PM: CPT | Performed by: NUCLEAR MEDICINE

## 2023-04-19 ENCOUNTER — PROCEDURE VISIT (OUTPATIENT)
Dept: CARDIOLOGY CLINIC | Age: 73
End: 2023-04-19
Payer: MEDICARE

## 2023-04-19 DIAGNOSIS — Z95.0 PACEMAKER: Primary | ICD-10-CM

## 2023-04-19 NOTE — PROGRESS NOTES
[facility]   Patient of Baki    Battery 13.4 years    Presenting rhythm APVS    A Impedance 494  RV Impedance 513    P wave sensing 2.3  R wave sensing 9    A Threshold 0.625 @ 0.40  A Amplitude 1.50 @ 0.40  RV Thresholds 0.75 @ 0.40  RV Amplitude 2.0 @ 0.40      A Paced 89.6%  V Paced <0.1%    Programmed Mode AAIR <=> DDDR       Afib [place] <0.1%    Episodes   3 runs NS VT

## 2023-04-24 ENCOUNTER — OFFICE VISIT (OUTPATIENT)
Dept: CARDIOLOGY CLINIC | Age: 73
End: 2023-04-24
Payer: MEDICARE

## 2023-04-24 VITALS
HEART RATE: 63 BPM | BODY MASS INDEX: 29.06 KG/M2 | HEIGHT: 70 IN | SYSTOLIC BLOOD PRESSURE: 120 MMHG | WEIGHT: 203 LBS | DIASTOLIC BLOOD PRESSURE: 72 MMHG

## 2023-04-24 DIAGNOSIS — Z95.0 PACEMAKER: Primary | ICD-10-CM

## 2023-04-24 DIAGNOSIS — I49.1 PAC (PREMATURE ATRIAL CONTRACTION): ICD-10-CM

## 2023-04-24 DIAGNOSIS — R00.1 BRADYCARDIA: ICD-10-CM

## 2023-04-24 PROCEDURE — 3017F COLORECTAL CA SCREEN DOC REV: CPT | Performed by: NUCLEAR MEDICINE

## 2023-04-24 PROCEDURE — 99214 OFFICE O/P EST MOD 30 MIN: CPT | Performed by: NUCLEAR MEDICINE

## 2023-04-24 PROCEDURE — 93000 ELECTROCARDIOGRAM COMPLETE: CPT | Performed by: NUCLEAR MEDICINE

## 2023-04-24 PROCEDURE — 1123F ACP DISCUSS/DSCN MKR DOCD: CPT | Performed by: NUCLEAR MEDICINE

## 2023-04-24 PROCEDURE — 1036F TOBACCO NON-USER: CPT | Performed by: NUCLEAR MEDICINE

## 2023-04-24 PROCEDURE — G8428 CUR MEDS NOT DOCUMENT: HCPCS | Performed by: NUCLEAR MEDICINE

## 2023-04-24 PROCEDURE — G8417 CALC BMI ABV UP PARAM F/U: HCPCS | Performed by: NUCLEAR MEDICINE

## 2023-04-24 NOTE — PROGRESS NOTES
[address] [facility] [address]  [address]  [facility] [address]  Dept: [phone]  Dept Fax: [phone]  Loc: [phone]    Visit Date: 4/24/2023    Poonam Stafford is a 74 y.o. male who presents todayfor:  Chief Complaint   Patient presents with    Follow-up    Palpitations    Dizziness    Shortness of Breath     Does have a pacer  Does have dizziness  More dyspnea  Exertional in nature  Had a cath 2021  Mild CAD  No syncope  BP is stable   No meds for it       HPI:  HPI  Past Medical History:   Diagnosis Date    Arthritis     ASHD (arteriosclerotic heart disease) 01/21/2013    Eructation 08/14/2014    GERD (gastroesophageal reflux disease)     Hyperlipidemia     Sepsis due to urinary tract infection ([facility] [address] 02/25/2022    Splenomegaly 09/15/2014      Past Surgical History:   Procedure Laterality Date    APPENDECTOMY  09/22/2016    Dr. Johan Maya    COLONOSCOPY  07/26/2016    ,   [address] STENT PLACEMENT  2013    2 stents lad    FINGER SURGERY       Family History   Problem Relation Age of Onset    Heart Disease Father 50        MI    Stroke Father     Heart Disease Brother 61        STENTS    Arthritis Sister     Heart Disease Brother     Heart Disease Brother     No Known Problems Brother     No Known Problems Brother     No Known Problems Brother     No Known Problems Sister     No Known Problems Sister     No Known Problems Sister      Social History     Tobacco Use    Smoking status: Never    Smokeless tobacco: Never   Substance Use Topics    Alcohol use:  Yes     Alcohol/week: 1.0 standard drink     Types: 1 Glasses of wine per week     Comment: socially      Current Outpatient Medications   Medication Sig Dispense Refill    hydroCHLOROthiazide (HYDRODIURIL) 25 MG tablet TAKE 1 TABLET BY MOUTH DAILY 90 tablet 0    simvastatin (ZOCOR) 40 MG tablet Take 1 tablet by mouth nightly 90 tablet 0    omeprazole (PRILOSEC) 40 MG

## 2023-05-03 ENCOUNTER — HOSPITAL ENCOUNTER (OUTPATIENT)
Dept: NON INVASIVE DIAGNOSTICS | Age: 73
Discharge: HOME | End: 2023-05-03
Payer: MEDICARE

## 2023-05-03 DIAGNOSIS — I49.1 PAC (PREMATURE ATRIAL CONTRACTION): ICD-10-CM

## 2023-05-03 DIAGNOSIS — Z95.0 PACEMAKER: ICD-10-CM

## 2023-05-03 DIAGNOSIS — R00.1 BRADYCARDIA: ICD-10-CM

## 2023-05-03 LAB
LV EF: 55 %
LVEF MODALITY: NORMAL

## 2023-05-03 PROCEDURE — 93306 TTE W/DOPPLER COMPLETE: CPT

## 2023-05-05 ENCOUNTER — TELEPHONE (OUTPATIENT)
Dept: CARDIOLOGY CLINIC | Age: 73
End: 2023-05-05

## 2023-05-05 DIAGNOSIS — I49.1 PAC (PREMATURE ATRIAL CONTRACTION): ICD-10-CM

## 2023-05-05 DIAGNOSIS — I25.10 ASHD (ARTERIOSCLEROTIC HEART DISEASE): ICD-10-CM

## 2023-05-05 DIAGNOSIS — Z95.0 PACEMAKER: Primary | ICD-10-CM

## 2023-05-05 DIAGNOSIS — R06.02 SHORTNESS OF BREATH: ICD-10-CM

## 2023-05-05 DIAGNOSIS — I10 ESSENTIAL HYPERTENSION: ICD-10-CM

## 2023-05-05 DIAGNOSIS — I20.9 ANGINA PECTORIS, UNSPECIFIED (HCC): ICD-10-CM

## 2023-05-05 DIAGNOSIS — I25.119 ATHEROSCLEROSIS OF NATIVE CORONARY ARTERY OF NATIVE HEART WITH ANGINA PECTORIS (HCC): ICD-10-CM

## 2023-05-05 DIAGNOSIS — R00.1 BRADYCARDIA: ICD-10-CM

## 2023-05-09 ENCOUNTER — NURSE ONLY (OUTPATIENT)
Dept: LAB | Age: 73
End: 2023-05-09

## 2023-05-09 DIAGNOSIS — I25.10 ASHD (ARTERIOSCLEROTIC HEART DISEASE): ICD-10-CM

## 2023-05-09 DIAGNOSIS — I49.1 PAC (PREMATURE ATRIAL CONTRACTION): ICD-10-CM

## 2023-05-09 DIAGNOSIS — I10 ESSENTIAL HYPERTENSION: ICD-10-CM

## 2023-05-09 DIAGNOSIS — R06.02 SHORTNESS OF BREATH: ICD-10-CM

## 2023-05-09 DIAGNOSIS — R00.1 BRADYCARDIA: ICD-10-CM

## 2023-05-09 DIAGNOSIS — I25.119 ATHEROSCLEROSIS OF NATIVE CORONARY ARTERY OF NATIVE HEART WITH ANGINA PECTORIS (HCC): ICD-10-CM

## 2023-05-09 LAB
DEPRECATED RDW RBC AUTO: 44.5 FL (ref 35–45)
ERYTHROCYTE [DISTWIDTH] IN BLOOD BY AUTOMATED COUNT: 13.8 % (ref 11.5–14.5)
HCT VFR BLD AUTO: 41.1 % (ref 42–52)
HGB BLD-MCNC: 13.2 GM/DL (ref 14–18)
MCH RBC QN AUTO: 28.6 PG (ref 26–33)
MCHC RBC AUTO-ENTMCNC: 32.1 GM/DL (ref 32.2–35.5)
MCV RBC AUTO: 89.2 FL (ref 80–94)
PLATELET # BLD AUTO: 132 THOU/MM3 (ref 130–400)
PMV BLD AUTO: 10.2 FL (ref 9.4–12.4)
RBC # BLD AUTO: 4.61 MILL/MM3 (ref 4.7–6.1)
WBC # BLD AUTO: 4.9 THOU/MM3 (ref 4.8–10.8)

## 2023-05-10 LAB
ANION GAP SERPL CALC-SCNC: 10 MEQ/L (ref 8–16)
BUN SERPL-MCNC: 24 MG/DL (ref 7–22)
CALCIUM SERPL-MCNC: 9.1 MG/DL (ref 8.5–10.5)
CHLORIDE SERPL-SCNC: 103 MEQ/L (ref 98–111)
CO2 SERPL-SCNC: 29 MEQ/L (ref 23–33)
CREAT SERPL-MCNC: 1.2 MG/DL (ref 0.4–1.2)
GFR SERPL CREATININE-BSD FRML MDRD: > 60 ML/MIN/1.73M2
GLUCOSE SERPL-MCNC: 103 MG/DL (ref 70–108)
POTASSIUM SERPL-SCNC: 4.3 MEQ/L (ref 3.5–5.2)
SODIUM SERPL-SCNC: 142 MEQ/L (ref 135–145)

## 2023-05-23 ENCOUNTER — HOSPITAL ENCOUNTER (OUTPATIENT)
Dept: PULMONOLOGY | Age: 73
Discharge: HOME | End: 2023-05-23
Payer: MEDICARE

## 2023-05-23 DIAGNOSIS — I25.119 ATHEROSCLEROSIS OF NATIVE CORONARY ARTERY OF NATIVE HEART WITH ANGINA PECTORIS (HCC): ICD-10-CM

## 2023-05-23 DIAGNOSIS — R00.1 BRADYCARDIA: ICD-10-CM

## 2023-05-23 DIAGNOSIS — I25.10 ASHD (ARTERIOSCLEROTIC HEART DISEASE): ICD-10-CM

## 2023-05-23 DIAGNOSIS — I10 ESSENTIAL HYPERTENSION: ICD-10-CM

## 2023-05-23 DIAGNOSIS — R06.02 SHORTNESS OF BREATH: ICD-10-CM

## 2023-05-23 DIAGNOSIS — I49.1 PAC (PREMATURE ATRIAL CONTRACTION): ICD-10-CM

## 2023-05-23 PROCEDURE — 94060 EVALUATION OF WHEEZING: CPT

## 2023-05-23 PROCEDURE — 94729 DIFFUSING CAPACITY: CPT

## 2023-05-23 PROCEDURE — 94726 PLETHYSMOGRAPHY LUNG VOLUMES: CPT

## 2023-05-25 ENCOUNTER — TELEPHONE (OUTPATIENT)
Dept: CARDIOLOGY CLINIC | Age: 73
End: 2023-05-25

## 2023-05-31 ENCOUNTER — OFFICE VISIT (OUTPATIENT)
Dept: PULMONOLOGY | Age: 73
End: 2023-05-31

## 2023-05-31 VITALS
SYSTOLIC BLOOD PRESSURE: 122 MMHG | WEIGHT: 201.2 LBS | DIASTOLIC BLOOD PRESSURE: 84 MMHG | OXYGEN SATURATION: 96 % | TEMPERATURE: 97.9 F | HEART RATE: 86 BPM | BODY MASS INDEX: 28.8 KG/M2 | HEIGHT: 70 IN

## 2023-05-31 DIAGNOSIS — J44.9 STAGE 1 MILD COPD BY GOLD CLASSIFICATION (HCC): ICD-10-CM

## 2023-05-31 DIAGNOSIS — G47.19 EXCESSIVE DAYTIME SLEEPINESS: ICD-10-CM

## 2023-05-31 DIAGNOSIS — Z95.0 STATUS POST PLACEMENT OF CARDIAC PACEMAKER: ICD-10-CM

## 2023-05-31 DIAGNOSIS — R09.89 RALES 1/3 WAY UP POSTERIOR CHEST WALL ON LEFT SIDE: ICD-10-CM

## 2023-05-31 DIAGNOSIS — K21.9 GASTROESOPHAGEAL REFLUX DISEASE WITHOUT ESOPHAGITIS: ICD-10-CM

## 2023-05-31 DIAGNOSIS — R06.02 SOB (SHORTNESS OF BREATH): Primary | ICD-10-CM

## 2023-05-31 DIAGNOSIS — R06.83 SNORING: ICD-10-CM

## 2023-05-31 ASSESSMENT — ENCOUNTER SYMPTOMS
CHOKING: 0
VOICE CHANGE: 0
CHEST TIGHTNESS: 0
SHORTNESS OF BREATH: 1
GASTROINTESTINAL NEGATIVE: 1
STRIDOR: 0
COUGH: 0
EYES NEGATIVE: 1
ALLERGIC/IMMUNOLOGIC NEGATIVE: 1
WHEEZING: 0
APNEA: 0

## 2023-05-31 NOTE — PROGRESS NOTES
Subjective:      Patient ID: Clyde Webber is a 74 y.o. male. HPI  Lindy Richards comes with his wife referred by Dr. Guzman Limon had an abnormal set of pulmonary function test revealing COPD stage I with air trapping and a mild reduction in diffusion capacity. 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 Viet's father had COPD he has smoke a pipe. Lindy Richards denies cough, sputum production, wheezing, hemoptysis, abnormal weight changes, he experiences bilateral lower extremity edema for which he takes diuretics, Lindy Richards tells me there is no explanation for this edema, recent echocardiogram reveals normal biventricular function both diastolic and systolic phases. He history of hydronephrosis for which he has scheduled appointment with urology, episodes of urosepsis in the past  Recent chest radiograph dated 4/6/2023 reveals clear lung fields no evidence of acute or chronic intrathoracic disease there is a pacemaker is seen. History of severe GERD was told that he needs to remain on PPIs for the rest of his life. PFTs: FVC: 88%, FV, C over FEV1 ratio 69%, FEV1 82%, residual volume 121%, diffusion capacity 78%.     6-minute walk completed

## 2023-06-06 ENCOUNTER — HOSPITAL ENCOUNTER (OUTPATIENT)
Dept: CT IMAGING | Age: 73
Discharge: HOME | End: 2023-06-06
Attending: INTERNAL MEDICINE
Payer: MEDICARE

## 2023-06-06 DIAGNOSIS — R06.02 SOB (SHORTNESS OF BREATH): ICD-10-CM

## 2023-06-06 DIAGNOSIS — J44.9 STAGE 1 MILD COPD BY GOLD CLASSIFICATION (HCC): ICD-10-CM

## 2023-06-06 DIAGNOSIS — R09.89 RALES 1/3 WAY UP POSTERIOR CHEST WALL ON LEFT SIDE: ICD-10-CM

## 2023-06-06 PROCEDURE — 71250 CT THORAX DX C-: CPT

## 2023-06-14 DIAGNOSIS — J44.9 STAGE 1 MILD COPD BY GOLD CLASSIFICATION (HCC): ICD-10-CM

## 2023-06-14 DIAGNOSIS — R06.02 SOB (SHORTNESS OF BREATH): ICD-10-CM

## 2023-06-14 DIAGNOSIS — R09.89 RALES 1/3 WAY UP POSTERIOR CHEST WALL ON LEFT SIDE: ICD-10-CM

## 2023-06-14 PROCEDURE — 36415 COLL VENOUS BLD VENIPUNCTURE: CPT | Performed by: INTERNAL MEDICINE

## 2023-06-19 ENCOUNTER — HOSPITAL ENCOUNTER (OUTPATIENT)
Dept: SLEEP CENTER | Age: 73
Discharge: HOME | End: 2023-06-21
Payer: MEDICARE

## 2023-06-19 DIAGNOSIS — R06.83 SNORING: ICD-10-CM

## 2023-06-19 DIAGNOSIS — G47.19 EXCESSIVE DAYTIME SLEEPINESS: ICD-10-CM

## 2023-06-19 PROCEDURE — 95806 SLEEP STUDY UNATT&RESP EFFT: CPT

## 2023-06-19 NOTE — PROGRESS NOTES
identify and monitor trends of sensor, service and device issues. Results will be audited and reported quarterly. Logs to Include:    Date and Time of call o Name of patient and person calling o Device ID number o Issue identified or nature of problem  o Resolution or recommendation for change. Confirm return date and time with the patient    Patients should be informed to call 911 should an emergency happen during their study. Documentation should be completed in Epic and should include but not be limited to: Instruct of the HSAT unit was completed o Embletta device number   Instruct of patient to call 911 in case of emergency o Any malfunctions that occur   Upon return the sleep study should be downloaded and scored within 24 hours            After download of data, patient information should be erased from the Embletta unit  The Device will allow display of raw data for manual scoring and editing  Scoring will be completed by trained scoring staff which may include:   RPSGT, RST, CPSGT, RRT-SDS or CRT-SDS   Title: Adult Polysomnography  Page: 3 of 3    The HSAT report will include the items listed in the most current version of the AASM Scoring Manual and an indication of whether the results support the diagnosis of obstructive sleep apnea.

## 2023-06-20 LAB — STATUS: NORMAL

## 2023-06-27 ENCOUNTER — OFFICE VISIT (OUTPATIENT)
Dept: PULMONOLOGY | Age: 73
End: 2023-06-27
Payer: MEDICARE

## 2023-06-27 VITALS
DIASTOLIC BLOOD PRESSURE: 82 MMHG | TEMPERATURE: 97.7 F | HEART RATE: 58 BPM | HEIGHT: 70 IN | WEIGHT: 202.4 LBS | SYSTOLIC BLOOD PRESSURE: 124 MMHG | BODY MASS INDEX: 28.98 KG/M2 | OXYGEN SATURATION: 94 %

## 2023-06-27 DIAGNOSIS — I25.10 CAD IN NATIVE ARTERY: ICD-10-CM

## 2023-06-27 DIAGNOSIS — J44.9 CHRONIC OBSTRUCTIVE PULMONARY DISEASE, UNSPECIFIED COPD TYPE (HCC): ICD-10-CM

## 2023-06-27 DIAGNOSIS — G47.33 OBSTRUCTIVE SLEEP APNEA: Primary | ICD-10-CM

## 2023-06-27 PROBLEM — R06.02 SHORTNESS OF BREATH: Status: ACTIVE | Noted: 2023-06-27

## 2023-06-27 PROBLEM — R09.89 RESPIRATORY CRACKLES: Status: ACTIVE | Noted: 2023-06-27

## 2023-06-27 PROCEDURE — G8417 CALC BMI ABV UP PARAM F/U: HCPCS | Performed by: INTERNAL MEDICINE

## 2023-06-27 PROCEDURE — G8427 DOCREV CUR MEDS BY ELIG CLIN: HCPCS | Performed by: INTERNAL MEDICINE

## 2023-06-27 PROCEDURE — 1036F TOBACCO NON-USER: CPT | Performed by: INTERNAL MEDICINE

## 2023-06-27 PROCEDURE — 3017F COLORECTAL CA SCREEN DOC REV: CPT | Performed by: INTERNAL MEDICINE

## 2023-06-27 PROCEDURE — 3023F SPIROM DOC REV: CPT | Performed by: INTERNAL MEDICINE

## 2023-06-27 PROCEDURE — 99214 OFFICE O/P EST MOD 30 MIN: CPT | Performed by: INTERNAL MEDICINE

## 2023-06-27 PROCEDURE — 1123F ACP DISCUSS/DSCN MKR DOCD: CPT | Performed by: INTERNAL MEDICINE

## 2023-07-18 ENCOUNTER — TELEPHONE (OUTPATIENT)
Dept: UROLOGY | Age: 73
End: 2023-07-18

## 2023-07-18 ENCOUNTER — OFFICE VISIT (OUTPATIENT)
Dept: UROLOGY | Age: 73
End: 2023-07-18
Payer: MEDICARE

## 2023-07-18 VITALS
DIASTOLIC BLOOD PRESSURE: 68 MMHG | HEIGHT: 70 IN | BODY MASS INDEX: 28.88 KG/M2 | WEIGHT: 201.7 LBS | SYSTOLIC BLOOD PRESSURE: 130 MMHG

## 2023-07-18 DIAGNOSIS — R31.29 MICROHEMATURIA: ICD-10-CM

## 2023-07-18 DIAGNOSIS — N40.1 BENIGN LOCALIZED PROSTATIC HYPERPLASIA WITH LOWER URINARY TRACT SYMPTOMS (LUTS): Primary | ICD-10-CM

## 2023-07-18 LAB
BACTERIA: ABNORMAL
BILIRUB UR QL STRIP: NEGATIVE
BILIRUBIN URINE: NEGATIVE
BLOOD URINE, POC: ABNORMAL
CASTS #/AREA URNS LPF: ABNORMAL /LPF
CASTS #/AREA URNS LPF: ABNORMAL /LPF
CHARACTER UR: CLEAR
CHARACTER, URINE: CLEAR
CHARCOAL URNS QL MICRO: ABNORMAL
COLOR UR: YELLOW
COLOR, URINE: YELLOW
CRYSTALS URNS QL MICRO: ABNORMAL
EPITHELIAL CELLS, UA: ABNORMAL /HPF
GLUCOSE UR QL STRIP.AUTO: NEGATIVE MG/DL
GLUCOSE URINE: NEGATIVE MG/DL
HGB UR QL STRIP.AUTO: NEGATIVE
KETONES UR QL STRIP.AUTO: 40
KETONES, URINE: 80
LEUKOCYTE CLUMPS, URINE: NEGATIVE
LEUKOCYTE ESTERASE UR QL STRIP.AUTO: NEGATIVE
NITRITE UR QL STRIP.AUTO: NEGATIVE
NITRITE, URINE: NEGATIVE
PH UR STRIP.AUTO: 5.5 [PH] (ref 5–9)
PH, URINE: 5.5 (ref 5–9)
PROT UR STRIP.AUTO-MCNC: NEGATIVE MG/DL
PROTEIN, URINE: NEGATIVE MG/DL
RBC #/AREA URNS HPF: ABNORMAL /HPF
RENAL EPI CELLS #/AREA URNS HPF: ABNORMAL /[HPF]
SPECIFIC GRAVITY UA: 1.01 (ref 1–1.03)
SPECIFIC GRAVITY, URINE: 1.02 (ref 1–1.03)
UROBILINOGEN, URINE: 0.2 EU/DL (ref 0–1)
UROBILINOGEN, URINE: 0.2 EU/DL (ref 0–1)
WBC #/AREA URNS HPF: ABNORMAL /HPF
YEAST LIKE FUNGI URNS QL MICRO: ABNORMAL

## 2023-07-18 PROCEDURE — 81003 URINALYSIS AUTO W/O SCOPE: CPT | Performed by: UROLOGY

## 2023-07-18 PROCEDURE — 1036F TOBACCO NON-USER: CPT | Performed by: UROLOGY

## 2023-07-18 PROCEDURE — G8417 CALC BMI ABV UP PARAM F/U: HCPCS | Performed by: UROLOGY

## 2023-07-18 PROCEDURE — G8427 DOCREV CUR MEDS BY ELIG CLIN: HCPCS | Performed by: UROLOGY

## 2023-07-18 PROCEDURE — 1123F ACP DISCUSS/DSCN MKR DOCD: CPT | Performed by: UROLOGY

## 2023-07-18 PROCEDURE — 3017F COLORECTAL CA SCREEN DOC REV: CPT | Performed by: UROLOGY

## 2023-07-18 PROCEDURE — 99204 OFFICE O/P NEW MOD 45 MIN: CPT | Performed by: UROLOGY

## 2023-07-18 RX ORDER — TAMSULOSIN HYDROCHLORIDE 0.4 MG/1
0.4 CAPSULE ORAL DAILY
Qty: 90 CAPSULE | Refills: 3 | Status: SHIPPED | OUTPATIENT
Start: 2023-07-18 | End: 2023-10-16

## 2023-07-18 NOTE — PROGRESS NOTES
Dori Gutiérrez MD.    [address]  [facility].  [address]  [facility] [address]  Dept: [phone]  Dept Fax: [phone]  [facility] -     Patient:  Bushra Jacob  YOB: 1950    The patient is a 74 y.o. male who presents today for evaluation of the following problems:   Chief Complaint   Patient presents with    New Patient     UTI without hematuria    referred/consultation requested by Brett Guillaume MD.    History of Present Illness:    BPH  Recent UTI in [place]  Mild LUTS  Hx of hydronephrosis in past -- saw [facility]  Does complain of some LE swelling    microhematuria  Microhematuria on UA today  nonsmoker        Requested/reviewed records from Brett Guillaume MD office and/or outside physician/EMR    (Patient's old records have been requested, reviewed and pertinent findings summarized in today's note.)    Procedures Today: N/A      Last several PSA's:  No results found for: PSA    Last total testosterone:  No results found for: TESTOSTERONE    Urinalysis today:  Results for POC orders placed in visit on 07/18/23   POCT Urinalysis No Micro (Auto)   Result Value Ref Range    Glucose, Ur Negative NEGATIVE mg/dl    Bilirubin Urine Negative     Ketones, Urine 80 (A) NEGATIVE    Specific Gravity, Urine 1.020 1.002 - 1.030    Blood, UA POC Trace-lysed NEGATIVE    pH, Urine 5.50 5.0 - 9.0    Protein, Urine Negative NEGATIVE mg/dl    Urobilinogen, Urine 0.20 0.0 - 1.0 eu/dl    Nitrite, Urine Negative NEGATIVE    Leukocyte Clumps, Urine Negative NEGATIVE    Color, Urine Yellow YELLOW-STRAW    Character, Urine Clear [id number].[id number]       Last BUN and creatinine:  Lab Results   Component Value Date    BUN 24 (H) 05/09/2023     Lab Results   Component Value Date    CREATININE 1.2 05/09/2023         Imaging Reviewed during this Office Visit:   Maryjane Linda MD independently reviewed the images and

## 2023-07-18 NOTE — TELEPHONE ENCOUNTER
Patient scheduled for US RETROPERITONEAL COMP  at [address] [facility] on 9/11/2023 ARRIVAL OF 845AM FOR A  9AM SCAN. NO CARBONATED BEVERAGES;ARRIVE WELL HYDRATED WITH A FULL BLADDER.     Order mailed with instructions  to the patient

## 2023-07-25 PROCEDURE — 93294 REM INTERROG EVL PM/LDLS PM: CPT | Performed by: INTERNAL MEDICINE

## 2023-07-25 PROCEDURE — 93296 REM INTERROG EVL PM/IDS: CPT | Performed by: INTERNAL MEDICINE

## 2023-07-26 ENCOUNTER — PROCEDURE VISIT (OUTPATIENT)
Dept: CARDIOLOGY CLINIC | Age: 73
End: 2023-07-26
Payer: MEDICARE

## 2023-07-26 DIAGNOSIS — Z95.0 PACEMAKER: Primary | ICD-10-CM

## 2023-07-26 NOTE — PROGRESS NOTES
[facility] Med[employer] Dual [employer]   Patient of [facility]aki    Battery 13.1 years    Presenting rhythm AP VS    A Impedance 475  RV Impedance 513    P wave sensing 2.4  R wave sensing 7.8    A Threshold 0.625 @ 0.40  A Amplitude 1.50 @ 0.40  RV Thresholds 0.625 @ 0.40  RV Amplitude 2.0 @ 0.40      A Paced 87.3%  V Paced <0.1%    Programmed Mode AAIR <=> DDDR       Afib [place] <0.1%    Episodes   6/17/23- VT

## 2023-09-06 ENCOUNTER — HOSPITAL ENCOUNTER (OUTPATIENT)
Dept: ULTRASOUND IMAGING | Age: 73
Discharge: HOME | End: 2023-09-06
Attending: UROLOGY
Payer: MEDICARE

## 2023-09-06 DIAGNOSIS — N40.1 BENIGN LOCALIZED PROSTATIC HYPERPLASIA WITH LOWER URINARY TRACT SYMPTOMS (LUTS): ICD-10-CM

## 2023-09-06 DIAGNOSIS — R31.29 MICROHEMATURIA: ICD-10-CM

## 2023-09-06 PROCEDURE — 76770 US EXAM ABDO BACK WALL COMP: CPT

## 2023-09-07 RX ORDER — HYDROCHLOROTHIAZIDE 25 MG/1
25 TABLET ORAL DAILY
Qty: 90 TABLET | Refills: 3 | Status: SHIPPED | OUTPATIENT
Start: 2023-09-07

## 2023-09-07 RX ORDER — SIMVASTATIN 40 MG
40 TABLET ORAL NIGHTLY
Qty: 90 TABLET | Refills: 3 | Status: SHIPPED | OUTPATIENT
Start: 2023-09-07

## 2023-09-21 ENCOUNTER — PATIENT MESSAGE (OUTPATIENT)
Dept: CARDIOLOGY CLINIC | Age: 73
End: 2023-09-21

## 2023-09-21 ENCOUNTER — HOSPITAL ENCOUNTER (OUTPATIENT)
Dept: CT IMAGING | Age: 73
Discharge: HOME | End: 2023-09-21
Attending: FAMILY MEDICINE
Payer: MEDICARE

## 2023-09-21 ENCOUNTER — NURSE ONLY (OUTPATIENT)
Dept: LAB | Age: 73
End: 2023-09-21

## 2023-09-21 ENCOUNTER — OFFICE VISIT (OUTPATIENT)
Dept: UROLOGY | Age: 73
End: 2023-09-21
Payer: MEDICARE

## 2023-09-21 ENCOUNTER — TELEPHONE (OUTPATIENT)
Dept: CARDIOLOGY CLINIC | Age: 73
End: 2023-09-21

## 2023-09-21 VITALS — HEIGHT: 69 IN | BODY MASS INDEX: 30.51 KG/M2 | WEIGHT: 206 LBS | RESPIRATION RATE: 16 BRPM

## 2023-09-21 DIAGNOSIS — Z51.81 MEDICATION MONITORING ENCOUNTER: ICD-10-CM

## 2023-09-21 DIAGNOSIS — D50.9 IRON DEFICIENCY ANEMIA, UNSPECIFIED IRON DEFICIENCY ANEMIA TYPE: ICD-10-CM

## 2023-09-21 DIAGNOSIS — Z12.5 PROSTATE CANCER SCREENING: ICD-10-CM

## 2023-09-21 DIAGNOSIS — R42 DIZZINESS: ICD-10-CM

## 2023-09-21 DIAGNOSIS — Z11.59 ENCOUNTER FOR HEPATITIS C SCREENING TEST FOR LOW RISK PATIENT: ICD-10-CM

## 2023-09-21 DIAGNOSIS — D69.6 THROMBOCYTOPENIA (HCC): ICD-10-CM

## 2023-09-21 DIAGNOSIS — N40.1 BENIGN LOCALIZED PROSTATIC HYPERPLASIA WITH LOWER URINARY TRACT SYMPTOMS (LUTS): Primary | ICD-10-CM

## 2023-09-21 DIAGNOSIS — E78.9 LIPID DISORDER: ICD-10-CM

## 2023-09-21 LAB
ALBUMIN SERPL BCG-MCNC: 4.2 G/DL (ref 3.5–5.1)
ALP SERPL-CCNC: 56 U/L (ref 38–126)
ALT SERPL W/O P-5'-P-CCNC: 13 U/L (ref 11–66)
ANION GAP SERPL CALC-SCNC: 11 MEQ/L (ref 8–16)
AST SERPL-CCNC: 23 U/L (ref 5–40)
BASOPHILS ABSOLUTE: 0 THOU/MM3 (ref 0–0.1)
BASOPHILS NFR BLD AUTO: 0.8 %
BILIRUB CONJ SERPL-MCNC: < 0.2 MG/DL (ref 0–0.3)
BILIRUB SERPL-MCNC: 0.5 MG/DL (ref 0.3–1.2)
BILIRUBIN, POC: NORMAL
BLOOD URINE, POC: NORMAL
BUN SERPL-MCNC: 19 MG/DL (ref 7–22)
CALCIUM SERPL-MCNC: 9.4 MG/DL (ref 8.5–10.5)
CHLORIDE SERPL-SCNC: 103 MEQ/L (ref 98–111)
CHOLEST SERPL-MCNC: 138 MG/DL (ref 100–199)
CLARITY, POC: CLEAR
CO2 SERPL-SCNC: 28 MEQ/L (ref 23–33)
COLOR, POC: YELLOW
CREAT BLD-MCNC: 1.2 MG/DL (ref 0.5–1.2)
DEPRECATED RDW RBC AUTO: 42.6 FL (ref 35–45)
EOSINOPHIL NFR BLD AUTO: 3.2 %
EOSINOPHILS ABSOLUTE: 0.2 THOU/MM3 (ref 0–0.4)
ERYTHROCYTE [DISTWIDTH] IN BLOOD BY AUTOMATED COUNT: 13.6 % (ref 11.5–14.5)
GFR SERPL CREATININE-BSD FRML MDRD: > 60 ML/MIN/1.73M2
GLUCOSE SERPL-MCNC: 99 MG/DL (ref 70–108)
GLUCOSE URINE, POC: NORMAL
HCT VFR BLD AUTO: 41.6 % (ref 42–52)
HCV IGG SERPL QL IA: NEGATIVE
HDLC SERPL-MCNC: 41 MG/DL
HGB BLD-MCNC: 13.4 GM/DL (ref 14–18)
IMM GRANULOCYTES # BLD AUTO: 0.02 THOU/MM3 (ref 0–0.07)
IMM GRANULOCYTES NFR BLD AUTO: 0.4 %
IRON SERPL-MCNC: 95 UG/DL (ref 65–195)
KETONES, POC: NORMAL
LDLC SERPL CALC-MCNC: 85 MG/DL
LEUKOCYTE EST, POC: NORMAL
LYMPHOCYTES ABSOLUTE: 2.3 THOU/MM3 (ref 1–4.8)
LYMPHOCYTES NFR BLD AUTO: 44.8 %
MCH RBC QN AUTO: 28 PG (ref 26–33)
MCHC RBC AUTO-ENTMCNC: 32.2 GM/DL (ref 32.2–35.5)
MCV RBC AUTO: 86.8 FL (ref 80–94)
MONOCYTES ABSOLUTE: 0.4 THOU/MM3 (ref 0.4–1.3)
MONOCYTES NFR BLD AUTO: 6.9 %
NEUTROPHILS NFR BLD AUTO: 43.9 %
NITRITE, POC: NORMAL
NRBC BLD AUTO-RTO: 0 /100 WBC
PH, POC: 7.5
PLATELET # BLD AUTO: 113 THOU/MM3 (ref 130–400)
PMV BLD AUTO: 10.7 FL (ref 9.4–12.4)
POST VOID RESIDUAL (PVR): 36 ML
POTASSIUM SERPL-SCNC: 4.3 MEQ/L (ref 3.5–5.2)
PROT SERPL-MCNC: 6.4 G/DL (ref 6.1–8)
PROTEIN, POC: NORMAL
PSA SERPL-MCNC: 1.32 NG/ML (ref 0–1)
RBC # BLD AUTO: 4.79 MILL/MM3 (ref 4.7–6.1)
SEGMENTED NEUTROPHILS ABSOLUTE COUNT: 2.3 THOU/MM3 (ref 1.8–7.7)
SODIUM SERPL-SCNC: 142 MEQ/L (ref 135–145)
SPECIFIC GRAVITY, POC: 1.01
TRIGL SERPL-MCNC: 62 MG/DL (ref 0–199)
UROBILINOGEN, POC: 0.2
WBC # BLD AUTO: 5.2 THOU/MM3 (ref 4.8–10.8)

## 2023-09-21 PROCEDURE — 1036F TOBACCO NON-USER: CPT

## 2023-09-21 PROCEDURE — 81003 URINALYSIS AUTO W/O SCOPE: CPT

## 2023-09-21 PROCEDURE — 6360000004 HC RX CONTRAST MEDICATION: Performed by: FAMILY MEDICINE

## 2023-09-21 PROCEDURE — 51798 US URINE CAPACITY MEASURE: CPT

## 2023-09-21 PROCEDURE — G8417 CALC BMI ABV UP PARAM F/U: HCPCS

## 2023-09-21 PROCEDURE — 82565 ASSAY OF CREATININE: CPT

## 2023-09-21 PROCEDURE — 99214 OFFICE O/P EST MOD 30 MIN: CPT

## 2023-09-21 PROCEDURE — 70470 CT HEAD/BRAIN W/O & W/DYE: CPT

## 2023-09-21 PROCEDURE — 1123F ACP DISCUSS/DSCN MKR DOCD: CPT

## 2023-09-21 PROCEDURE — 3017F COLORECTAL CA SCREEN DOC REV: CPT

## 2023-09-21 PROCEDURE — G8427 DOCREV CUR MEDS BY ELIG CLIN: HCPCS

## 2023-09-21 RX ADMIN — IOPAMIDOL 75 ML: 755 INJECTION, SOLUTION INTRAVENOUS at 08:04

## 2023-09-21 ASSESSMENT — ENCOUNTER SYMPTOMS
COUGH: 0
COLOR CHANGE: 0
VOMITING: 0
FACIAL SWELLING: 0
EYE DISCHARGE: 0

## 2023-09-21 NOTE — TELEPHONE ENCOUNTER
Please see My Chart Message:     ----- Message from Sammy Anne sent at 9/21/2023 10:47 AM EDT -----  Regarding: Medication causing lightheadedness? Contact: [phone]  Kenda Habermann continues to be lightheaded when standing up. He has reduced the water pill to 1/2 tab ( 25 mg tab )and not noticed much difference. Could he try reducing his metoprolol to 25 mg one time day instead on twice a day? What negative effects should he watch for if he does this?    Thanks

## 2023-09-21 NOTE — PATIENT INSTRUCTIONS
Discontinue Flomax  Call if you need anything  Call with questions, comments, or concerns. I recommend going to the ED for further evaluation if you develop fever, chills, nausea, vomiting, chest pain, SOB, or calf pain.

## 2023-09-24 ENCOUNTER — HOSPITAL ENCOUNTER (EMERGENCY)
Age: 73
Discharge: HOME | End: 2023-09-24
Payer: OTHER GOVERNMENT

## 2023-09-24 ENCOUNTER — APPOINTMENT (OUTPATIENT)
Dept: GENERAL RADIOLOGY | Age: 73
End: 2023-09-24
Payer: OTHER GOVERNMENT

## 2023-09-24 VITALS
HEART RATE: 65 BPM | RESPIRATION RATE: 16 BRPM | BODY MASS INDEX: 28.63 KG/M2 | TEMPERATURE: 97.3 F | DIASTOLIC BLOOD PRESSURE: 51 MMHG | HEIGHT: 70 IN | OXYGEN SATURATION: 97 % | SYSTOLIC BLOOD PRESSURE: 129 MMHG | WEIGHT: 200 LBS

## 2023-09-24 DIAGNOSIS — S93.402A SPRAIN OF LEFT ANKLE, UNSPECIFIED LIGAMENT, INITIAL ENCOUNTER: Primary | ICD-10-CM

## 2023-09-24 PROCEDURE — 99212 OFFICE O/P EST SF 10 MIN: CPT

## 2023-09-24 PROCEDURE — 73610 X-RAY EXAM OF ANKLE: CPT

## 2023-09-24 PROCEDURE — 99213 OFFICE O/P EST LOW 20 MIN: CPT

## 2023-09-24 ASSESSMENT — PAIN SCALES - GENERAL: PAINLEVEL_OUTOF10: 6

## 2023-09-24 ASSESSMENT — PAIN - FUNCTIONAL ASSESSMENT: PAIN_FUNCTIONAL_ASSESSMENT: 0-10

## 2023-09-24 ASSESSMENT — PAIN DESCRIPTION - PAIN TYPE: TYPE: ACUTE PAIN

## 2023-09-24 ASSESSMENT — PAIN DESCRIPTION - DESCRIPTORS: DESCRIPTORS: ACHING

## 2023-09-24 ASSESSMENT — PAIN DESCRIPTION - LOCATION: LOCATION: ANKLE

## 2023-09-24 ASSESSMENT — PAIN DESCRIPTION - ORIENTATION: ORIENTATION: LEFT

## 2023-09-24 NOTE — ED TRIAGE NOTES
Left ankle pain Carrying chair down steps missed last step fell and twisted left ankle No LOC did hit head Occurred 9/23/23 1500.  Pt has ankle edema and medial ankle pain

## 2023-09-24 NOTE — ED PROVIDER NOTES
elevation, crutches. Follow up with PCP or [facility] walk in clinic if symptoms do not improve.  ER for worsening symptoms        PATIENT REFERRED TO:  Darius Grewal MD  [address] / [place] [address]      DISCHARGE MEDICATIONS:  New Prescriptions    No medications on file       Discontinued Medications    No medications on file       Current Discharge Medication List          Reyna Notice, APRN - CNP    (Please note that portions of this note were completed with a voice recognition program. Efforts were made to edit the dictations but occasionally words are mis-transcribed.)            Reyna Notice, APRN - CNP  09/24/23 9012

## 2023-09-24 NOTE — DISCHARGE INSTRUCTIONS
NO BROKEN BONES  Tylenol and Motrin  Ice  Elevation  Ace wrap  Crutches  Follow up with [facility] walk in clinic as needed if symptoms worsen or do not get better

## 2023-09-26 ENCOUNTER — OFFICE VISIT (OUTPATIENT)
Dept: PULMONOLOGY | Age: 73
End: 2023-09-26
Payer: MEDICARE

## 2023-09-26 VITALS
DIASTOLIC BLOOD PRESSURE: 78 MMHG | BODY MASS INDEX: 29.41 KG/M2 | HEART RATE: 82 BPM | TEMPERATURE: 97.3 F | WEIGHT: 205.4 LBS | SYSTOLIC BLOOD PRESSURE: 118 MMHG | HEIGHT: 70 IN | OXYGEN SATURATION: 95 %

## 2023-09-26 DIAGNOSIS — G47.33 OSA (OBSTRUCTIVE SLEEP APNEA): Primary | ICD-10-CM

## 2023-09-26 PROCEDURE — G8417 CALC BMI ABV UP PARAM F/U: HCPCS | Performed by: INTERNAL MEDICINE

## 2023-09-26 PROCEDURE — 1123F ACP DISCUSS/DSCN MKR DOCD: CPT | Performed by: INTERNAL MEDICINE

## 2023-09-26 PROCEDURE — 99213 OFFICE O/P EST LOW 20 MIN: CPT | Performed by: INTERNAL MEDICINE

## 2023-09-26 PROCEDURE — 1036F TOBACCO NON-USER: CPT | Performed by: INTERNAL MEDICINE

## 2023-09-26 PROCEDURE — G8427 DOCREV CUR MEDS BY ELIG CLIN: HCPCS | Performed by: INTERNAL MEDICINE

## 2023-09-26 PROCEDURE — 3017F COLORECTAL CA SCREEN DOC REV: CPT | Performed by: INTERNAL MEDICINE

## 2023-09-26 NOTE — PROGRESS NOTES
Sleep Medicine Follow Up  Kerry Lopez MD    Trudy Peck, 74 y.o.  [id number]     Nurses Notes   Set up 6.26.23    Date of Last Visit  6.27.23      Scan of Download                    Summary of PAP Download     Dates  8.26.23-9.24.23    Four Hour Compliance - 90 %. Average Hours/Day -  6 hr 26min       AHI -  4.5    Original AHI -  12.7     Initial Study Date -  6.19.23  PAP Type -  autoset      Machine Type - resmed      Pressure Settings -  8/18 cwp  Interface -  ffm    Provider  [x][facility]  [][employer]  [][employer]  [][facility]         [][facility] []Other:     ESS: 5 Saqli:84  Neck Circumference:    15.5 mal:2  INTERVAL HISTORY         Trudy Peck is a 74 y.o. old male who comes in for follow up regarding his obstructive sleep apnea. He is currently doing well using his positive airway pressure device. He is sleeping better at night with his machine and says he has less daytime sleepiness. On CPAP for the last 3 mos, new interface much better  APAP 8/16 cwp---mean pressure requirement 9 cwp max 11 cwp  C/O aerophagia with belching in am       The machine pressure settings are comfortable, the mask is reasonably comfortable and he is using the humidity.     Rolled his L ankle and went to , no fractures but the pain is disrupting sleep for the las few days   Past Medical History:   Diagnosis Date    Arthritis     ASHD (arteriosclerotic heart disease) 01/21/2013    Eructation 08/14/2014    GERD (gastroesophageal reflux disease)     Hyperlipidemia     Mild chronic obstructive pulmonary disease ([address]) 6/27/2023    Sepsis due to urinary tract infection ([address]) 02/25/2022    Splenomegaly 09/15/2014     Past Surgical History:   Procedure Laterality Date    APPENDECTOMY  09/22/2016    Dr. Dylan Hensley  07/26/2016    , [facility]  2013    2 stents lad    FINGER SURGERY      PACEMAKER PLACEMENT  2022       Social History     Tobacco Use    Smoking status: Never

## 2023-09-27 ENCOUNTER — NURSE ONLY (OUTPATIENT)
Dept: CARDIOLOGY CLINIC | Age: 73
End: 2023-09-27
Payer: MEDICARE

## 2023-09-27 DIAGNOSIS — Z95.0 PACEMAKER: ICD-10-CM

## 2023-09-27 PROCEDURE — 93280 PM DEVICE PROGR EVAL DUAL: CPT | Performed by: NUCLEAR MEDICINE

## 2023-09-27 NOTE — PROGRESS NOTES
Medtronic dual pacer in office       . [employer] Battery longevity:  13 years on device   Presenting rhythm  AP VS     Underlying SB's 30's  7/31/23 SVT     Atrial impedance 513  RV impedance 494    P wave sensing 2.6  R wave sensing 9.9    88.5 % atrial paced  0.1 % RV paced     Atrial threshold 0.5 V  at 0.4ms  RV threshold 0.75 V at 0.4ms  Mode switches   0

## 2023-12-26 PROCEDURE — 93296 REM INTERROG EVL PM/IDS: CPT | Performed by: NUCLEAR MEDICINE

## 2023-12-26 PROCEDURE — 93294 REM INTERROG EVL PM/LDLS PM: CPT | Performed by: NUCLEAR MEDICINE

## 2023-12-27 ENCOUNTER — PROCEDURE VISIT (OUTPATIENT)
Dept: CARDIOLOGY CLINIC | Age: 73
End: 2023-12-27
Payer: MEDICARE

## 2023-12-27 ENCOUNTER — TELEPHONE (OUTPATIENT)
Dept: CARDIOLOGY CLINIC | Age: 73
End: 2023-12-27

## 2023-12-27 DIAGNOSIS — Z95.0 PACEMAKER: Primary | ICD-10-CM

## 2023-12-27 NOTE — TELEPHONE ENCOUNTER
Can you ladies in the phone room please schedule this pt with the mid levels / thank you     See the notes below

## 2023-12-27 NOTE — TELEPHONE ENCOUNTER
Noted. Follow with mid levels to discuss NOACs       [facility] medtronic dual pacer      10/5/23  2+ hours AF  I do believe this is new   <0.1% burden          . Reji Qureshi Battery longevity:  12.7 years on device   Presenting rhythm  AP VS   SVT      Atrial impedance 475  RV impedance 475     P wave sensing 2.4  R wave sensing 7.9     84.4 % atrial paced  0.2 % RV paced      Atrial threshold 0.625 V  at 0.4ms  RV threshold 0.625 V at 0.4ms  Mode switches   <0.1

## 2023-12-27 NOTE — PROGRESS NOTES
[facility]     10/5/23  2+ hours AF  I do believe this is new   <0.1% burden        . Marcus Presume Battery longevity:  12.7 years on device   Presenting rhythm  AP VS   SVT     Atrial impedance 475  RV impedance 475    P wave sensing 2.4  R wave sensing 7.9    84.4 % atrial paced  0.2 % RV paced     Atrial threshold 0.625 V  at 0.4ms  RV threshold 0.625 V at 0.4ms  Mode switches   <0.1

## 2023-12-27 NOTE — TELEPHONE ENCOUNTER
Dr Kasandra Harada pt is in [facility] and will not be back until April  They saw a cardiologist in the past in [facility], but wife states everything they go there he wants to order more and more testing and she doesn't feel that it is necessary

## 2023-12-27 NOTE — TELEPHONE ENCOUNTER
Well he needs to know he had two hours of A fib   And basically discuss with him going on blood thinners to prevent strokes  If he is willing we can start eliquis 5 mg bid  Make sure we know if he is on other thinners   That's all the appointment about   Unless he wants to wait till he is back from [place] and we can re check the pacer then to see if having more A fib

## 2023-12-28 ENCOUNTER — TELEPHONE (OUTPATIENT)
Dept: CARDIOLOGY CLINIC | Age: 73
End: 2023-12-28

## 2023-12-28 NOTE — TELEPHONE ENCOUNTER
Spoke with wife this am, and she said she sent several questions to dr Noris Milian thru my chart. See messages, also states pt has enlarged spleen and could be reason low platelet count. If we send new medication contact pt as she would like it to go to [facility], but will have to have them change the address to [facility]. Pablo Riley also has chronic low H& H and low platelets  Last Blood work platelets was 626. WS  If putting on Eliquis, Wanted to mention also that Pablo Riley has a history of severe GERD. On diagnosis it was so bad that Dr Shaquille Merritt thought he had Lymphoma of stomach. He remains indefinitely on Omeprazole. It seems to be controlled at this point. Rhonda Sylvester Was called as follow up of pacer download by pacer clinic. We Saw in [facility] that AFib event lasted 2 hours 12/26 and Dr Noris Milian suggested Eliquis 5 mg bid. Looking back at his pacer history has he had any previous A fib events? Have a question about pacer. From what he has been told, his atrial heart function has little unassisted function without pacer. How does A Fib happen then? Could this be because of throwing darts, arm movement leads/wires etc. He said he felt fine. No dizzyness etc.      Just wondering and trying to understand. If Eliquis is ordered, it needs to go to [facility] mail order. Please let us know before calling it in as we will have to change mailing address as we are in [place] at present. He is on low dose aspirin and occasional motrin so will stop that if Eliquis ordered.

## 2023-12-28 NOTE — TELEPHONE ENCOUNTER
I am afraid the distant communication like we are doing is not going to help understand how this works  But the pacer doesn't prevent a fib from happening

## 2023-12-28 NOTE — TELEPHONE ENCOUNTER
Okay. If he has issues with blood disorder her might want to wait on considering eliquis until they come back from [place] and have more discussion other wise they can see the local doctor to go over things

## 2023-12-28 NOTE — TELEPHONE ENCOUNTER
John Laughter \"Bill\"  P [facility] Clinical Staff (supporting Rolo Reed MD)7 minutes ago (3:39 PM)     Merline dia     Have a question about pacer. From what he has been told, his atrial heart function has little unassisted function without pacer. How does A Fib happen then? Is A Fib as much a risk then since pacer keeps beat going? As you can tell, dont know much about this.

## 2023-12-28 NOTE — TELEPHONE ENCOUNTER
Vinicio Zacarias \"Bill\"  P [facility] Clinical Staff (supporting Rolo Cortez MD)50 minutes ago (6:55 AM)     KP Llanos also has chronic low H& H and low platelets  Last Blood work platelets was 113.            WS  If putting on Eliquis, Wanted to mention also that Viet has a history of severe GERD. On diagnosis it was so bad that Dr Christopher thought he had Lymphoma of stomach. He remains indefinitely on Omeprazole. It seems to be controlled at this point.             KP Llanos Was called as follow up of pacer download by pacer clinic. We Saw in mychart that AFib event lasted 2 hours 12/26 and Dr Early suggested Eliquis 5 mg bid.      Looking back at his pacer history has he had any previous A fib events?      Have a question about pacer.  From what he has been told, his atrial heart function has little unassisted function without pacer. How does A Fib happen then?       Could this be because of throwing darts, arm movement leads/wires etc. He said he felt fine. No dizzyness etc.      Just wondering and trying to understand.      If Eliquis is ordered, it needs to go to [facility] mail order. Please let us know before calling it in as we will have to change mailing address as we are in [place] at present. He is on low dose aspirin and occasional motrin so will stop that if Eliquis ordered.    -

## 2024-03-21 PROBLEM — N40.0 BENIGN PROSTATIC HYPERPLASIA: Status: ACTIVE | Noted: 2024-03-21

## 2024-03-21 PROBLEM — Z87.440 HX: UTI (URINARY TRACT INFECTION): Status: ACTIVE | Noted: 2024-03-21

## 2024-03-21 PROBLEM — Z86.19 HX OF SEPSIS: Status: ACTIVE | Noted: 2024-03-21

## 2024-03-22 ENCOUNTER — NURSE ONLY (OUTPATIENT)
Dept: LAB | Age: 74
End: 2024-03-22

## 2024-03-22 DIAGNOSIS — Z12.5 ENCOUNTER FOR SCREENING FOR MALIGNANT NEOPLASM OF PROSTATE: ICD-10-CM

## 2024-03-22 DIAGNOSIS — E87.6 HYPOKALEMIA: ICD-10-CM

## 2024-03-22 DIAGNOSIS — R35.0 FREQUENT URINATION: ICD-10-CM

## 2024-03-22 DIAGNOSIS — Z87.440 HX: UTI (URINARY TRACT INFECTION): ICD-10-CM

## 2024-03-22 DIAGNOSIS — N40.0 BENIGN PROSTATIC HYPERPLASIA, UNSPECIFIED WHETHER LOWER URINARY TRACT SYMPTOMS PRESENT: ICD-10-CM

## 2024-03-22 DIAGNOSIS — Z86.19 HX OF SEPSIS: ICD-10-CM

## 2024-03-22 LAB
ANION GAP SERPL CALC-SCNC: 12 MEQ/L (ref 8–16)
BASOPHILS ABSOLUTE: 0.1 THOU/MM3 (ref 0–0.1)
BASOPHILS NFR BLD AUTO: 1.1 %
BUN SERPL-MCNC: 27 MG/DL (ref 7–22)
CALCIUM SERPL-MCNC: 9 MG/DL (ref 8.5–10.5)
CHLORIDE SERPL-SCNC: 99 MEQ/L (ref 98–111)
CO2 SERPL-SCNC: 28 MEQ/L (ref 23–33)
CREAT SERPL-MCNC: 1.2 MG/DL (ref 0.4–1.2)
DEPRECATED RDW RBC AUTO: 42.5 FL (ref 35–45)
EOSINOPHIL NFR BLD AUTO: 2.4 %
EOSINOPHILS ABSOLUTE: 0.1 THOU/MM3 (ref 0–0.4)
ERYTHROCYTE [DISTWIDTH] IN BLOOD BY AUTOMATED COUNT: 13.5 % (ref 11.5–14.5)
GFR SERPL CREATININE-BSD FRML MDRD: > 60 ML/MIN/1.73M2
GLUCOSE SERPL-MCNC: 99 MG/DL (ref 70–108)
HCT VFR BLD AUTO: 40.1 % (ref 42–52)
HGB BLD-MCNC: 13.2 GM/DL (ref 14–18)
IMM GRANULOCYTES # BLD AUTO: 0.01 THOU/MM3 (ref 0–0.07)
IMM GRANULOCYTES NFR BLD AUTO: 0.2 %
LYMPHOCYTES ABSOLUTE: 1.7 THOU/MM3 (ref 1–4.8)
LYMPHOCYTES NFR BLD AUTO: 32.1 %
MCH RBC QN AUTO: 28.8 PG (ref 26–33)
MCHC RBC AUTO-ENTMCNC: 32.9 GM/DL (ref 32.2–35.5)
MCV RBC AUTO: 87.4 FL (ref 80–94)
MONOCYTES ABSOLUTE: 0.4 THOU/MM3 (ref 0.4–1.3)
MONOCYTES NFR BLD AUTO: 8 %
NEUTROPHILS NFR BLD AUTO: 56.2 %
NRBC BLD AUTO-RTO: 0 /100 WBC
PLATELET # BLD AUTO: 187 THOU/MM3 (ref 130–400)
PMV BLD AUTO: 10.2 FL (ref 9.4–12.4)
POTASSIUM SERPL-SCNC: 4 MEQ/L (ref 3.5–5.2)
PSA SERPL-MCNC: 5.71 NG/ML (ref 0–1)
RBC # BLD AUTO: 4.59 MILL/MM3 (ref 4.7–6.1)
SEGMENTED NEUTROPHILS ABSOLUTE COUNT: 3 THOU/MM3 (ref 1.8–7.7)
SODIUM SERPL-SCNC: 139 MEQ/L (ref 135–145)
WBC # BLD AUTO: 5.4 THOU/MM3 (ref 4.8–10.8)

## 2024-03-26 ENCOUNTER — OFFICE VISIT (OUTPATIENT)
Dept: UROLOGY | Age: 74
End: 2024-03-26
Payer: MEDICARE

## 2024-03-26 VITALS — HEIGHT: 70 IN | BODY MASS INDEX: 29.35 KG/M2 | WEIGHT: 205 LBS | RESPIRATION RATE: 16 BRPM

## 2024-03-26 DIAGNOSIS — N40.1 BENIGN LOCALIZED PROSTATIC HYPERPLASIA WITH LOWER URINARY TRACT SYMPTOMS (LUTS): Primary | ICD-10-CM

## 2024-03-26 DIAGNOSIS — R31.29 MICROHEMATURIA: ICD-10-CM

## 2024-03-26 DIAGNOSIS — R97.20 ELEVATED PSA: ICD-10-CM

## 2024-03-26 PROCEDURE — G8427 DOCREV CUR MEDS BY ELIG CLIN: HCPCS | Performed by: UROLOGY

## 2024-03-26 PROCEDURE — 99214 OFFICE O/P EST MOD 30 MIN: CPT | Performed by: UROLOGY

## 2024-03-26 PROCEDURE — G8417 CALC BMI ABV UP PARAM F/U: HCPCS | Performed by: UROLOGY

## 2024-03-26 PROCEDURE — 1036F TOBACCO NON-USER: CPT | Performed by: UROLOGY

## 2024-03-26 PROCEDURE — G8484 FLU IMMUNIZE NO ADMIN: HCPCS | Performed by: UROLOGY

## 2024-03-26 PROCEDURE — 1123F ACP DISCUSS/DSCN MKR DOCD: CPT | Performed by: UROLOGY

## 2024-03-26 RX ORDER — TAMSULOSIN HYDROCHLORIDE 0.4 MG/1
0.4 CAPSULE ORAL DAILY
Qty: 30 CAPSULE | Refills: 3 | Status: SHIPPED | OUTPATIENT
Start: 2024-03-26 | End: 2024-03-26

## 2024-03-26 RX ORDER — TAMSULOSIN HYDROCHLORIDE 0.4 MG/1
0.4 CAPSULE ORAL DAILY
Qty: 30 CAPSULE | Refills: 3 | Status: SHIPPED | OUTPATIENT
Start: 2024-03-26

## 2024-03-26 NOTE — PROGRESS NOTES
disease (HCC) 6/27/2023    Sepsis due to urinary tract infection (HCC) 02/25/2022    Splenomegaly 09/15/2014     Past Surgical History:   Procedure Laterality Date    APPENDECTOMY  09/22/2016    Dr. De La Cruz    COLONOSCOPY  07/26/2016    Eric Riddle    CORONARY ANGIOPLASTY WITH STENT PLACEMENT  2013    2 stents lad    FINGER SURGERY      PACEMAKER PLACEMENT  2022     Family History   Problem Relation Age of Onset    Heart Disease Father 48        MI    Stroke Father     Heart Disease Brother 63        STENTS    Arthritis Sister     Heart Disease Brother     Heart Disease Brother     No Known Problems Brother     No Known Problems Brother     No Known Problems Brother     No Known Problems Sister     No Known Problems Sister     No Known Problems Sister      Outpatient Medications Marked as Taking for the 3/26/24 encounter (Office Visit) with James Wheeler MD   Medication Sig Dispense Refill    oxyBUTYnin (DITROPAN) 5 MG tablet       tamsulosin (FLOMAX) 0.4 MG capsule Take 1 capsule by mouth daily      metoprolol tartrate (LOPRESSOR) 25 MG tablet Take 1 tablet by mouth daily 90 tablet 2    omeprazole (PRILOSEC) 40 MG delayed release capsule Take 1 capsule by mouth daily 90 capsule 3    hydroCHLOROthiazide (HYDRODIURIL) 25 MG tablet Take 1 tablet by mouth daily (Patient taking differently: Take 0.5 tablets by mouth daily) 90 tablet 3    simvastatin (ZOCOR) 40 MG tablet Take 1 tablet by mouth nightly 90 tablet 3    aspirin 81 MG EC tablet Take 1 tablet by mouth daily      nitroGLYCERIN (NITROSTAT) 0.4 MG SL tablet DISSOLVE 1 TAB UNDER TONGUE EVERY 5 MINUTES AS NEEDED FOR CHEST PAIN. After third dose and still no relief, call 911 25 tablet 3    Multiple Vitamins-Minerals (THERAPEUTIC MULTIVITAMIN-MINERALS) tablet Take 1 tablet by mouth daily         Patient has no known allergies.  Social History     Tobacco Use   Smoking Status Never   Smokeless Tobacco Never      (If patient a smoker, smoking cessation counseling

## 2024-04-02 PROCEDURE — 93296 REM INTERROG EVL PM/IDS: CPT | Performed by: NUCLEAR MEDICINE

## 2024-04-02 PROCEDURE — 93294 REM INTERROG EVL PM/LDLS PM: CPT | Performed by: NUCLEAR MEDICINE

## 2024-04-03 ENCOUNTER — PROCEDURE VISIT (OUTPATIENT)
Dept: CARDIOLOGY CLINIC | Age: 74
End: 2024-04-03
Payer: MEDICARE

## 2024-04-03 DIAGNOSIS — Z95.0 PACEMAKER: Primary | ICD-10-CM

## 2024-04-03 NOTE — PROGRESS NOTES
Dr jones pt  Medtronic dual pacer remote   Battery 12.3 yrs remaining    Aair<=>dddr     A paced 86%  V paced 0.4%    P waves 2.4  Rv waves 9  Atrial impedence 399  Vent impedence 475    Atrial threshold 0.5 @ 0.4  Vent threshold 0.625 @ 0.4    Atrial amplitude 1.5 @ 0.4  Vent amplitude 2 @ 0.4    Pt is having a lot of PVCS AND RUNS OF NS VT/ HE HAS AN APPT TO SEE DR JONES IN THE OFFICE ON  /NOTE PUT ON HIS APPT DESK

## 2024-04-29 ENCOUNTER — OFFICE VISIT (OUTPATIENT)
Dept: CARDIOLOGY CLINIC | Age: 74
End: 2024-04-29
Payer: MEDICARE

## 2024-04-29 ENCOUNTER — TELEPHONE (OUTPATIENT)
Dept: UROLOGY | Age: 74
End: 2024-04-29

## 2024-04-29 ENCOUNTER — PROCEDURE VISIT (OUTPATIENT)
Dept: UROLOGY | Age: 74
End: 2024-04-29
Payer: MEDICARE

## 2024-04-29 VITALS
SYSTOLIC BLOOD PRESSURE: 108 MMHG | WEIGHT: 204.2 LBS | HEIGHT: 70 IN | BODY MASS INDEX: 29.23 KG/M2 | DIASTOLIC BLOOD PRESSURE: 56 MMHG | HEART RATE: 81 BPM

## 2024-04-29 VITALS — RESPIRATION RATE: 16 BRPM | HEIGHT: 70 IN | WEIGHT: 205 LBS | BODY MASS INDEX: 29.35 KG/M2

## 2024-04-29 DIAGNOSIS — R31.29 MICROHEMATURIA: ICD-10-CM

## 2024-04-29 DIAGNOSIS — I25.10 ASHD (ARTERIOSCLEROTIC HEART DISEASE): ICD-10-CM

## 2024-04-29 DIAGNOSIS — N40.1 BENIGN LOCALIZED PROSTATIC HYPERPLASIA WITH LOWER URINARY TRACT SYMPTOMS (LUTS): Primary | ICD-10-CM

## 2024-04-29 DIAGNOSIS — I49.1 PAC (PREMATURE ATRIAL CONTRACTION): Primary | ICD-10-CM

## 2024-04-29 DIAGNOSIS — R97.20 ELEVATED PSA: ICD-10-CM

## 2024-04-29 DIAGNOSIS — Z95.0 PACEMAKER: ICD-10-CM

## 2024-04-29 DIAGNOSIS — Z01.818 PRE-OP TESTING: ICD-10-CM

## 2024-04-29 PROCEDURE — 93000 ELECTROCARDIOGRAM COMPLETE: CPT | Performed by: NUCLEAR MEDICINE

## 2024-04-29 PROCEDURE — 99214 OFFICE O/P EST MOD 30 MIN: CPT | Performed by: NUCLEAR MEDICINE

## 2024-04-29 PROCEDURE — G8427 DOCREV CUR MEDS BY ELIG CLIN: HCPCS | Performed by: NUCLEAR MEDICINE

## 2024-04-29 PROCEDURE — 52000 CYSTOURETHROSCOPY: CPT | Performed by: UROLOGY

## 2024-04-29 PROCEDURE — 1123F ACP DISCUSS/DSCN MKR DOCD: CPT | Performed by: NUCLEAR MEDICINE

## 2024-04-29 PROCEDURE — 1036F TOBACCO NON-USER: CPT | Performed by: NUCLEAR MEDICINE

## 2024-04-29 PROCEDURE — G8417 CALC BMI ABV UP PARAM F/U: HCPCS | Performed by: NUCLEAR MEDICINE

## 2024-04-29 RX ORDER — HYDROCHLOROTHIAZIDE 25 MG/1
12.5 TABLET ORAL DAILY
Qty: 45 TABLET | Refills: 3 | Status: SHIPPED | OUTPATIENT
Start: 2024-04-29

## 2024-04-29 RX ORDER — IBUPROFEN 200 MG
200 TABLET ORAL EVERY 6 HOURS PRN
COMMUNITY

## 2024-04-29 RX ORDER — SIMVASTATIN 40 MG
40 TABLET ORAL NIGHTLY
Qty: 90 TABLET | Refills: 3 | Status: SHIPPED | OUTPATIENT
Start: 2024-04-29

## 2024-04-29 RX ORDER — NITROGLYCERIN 0.4 MG/1
TABLET SUBLINGUAL
Qty: 25 TABLET | Refills: 3 | Status: SHIPPED | OUTPATIENT
Start: 2024-04-29

## 2024-04-29 NOTE — PROGRESS NOTES
[facility] PHYSICIANS [place] SPECIALTY  [facility] CARDIOLOGY  [address]  [address]  [facility] [address]  Dept: [phone]  Dept Fax: [phone]  Loc: [phone]    Visit Date: 4/29/2024    Vinicio Zacarias is a 74 y.o. male who presents todayfor:  Chief Complaint   Patient presents with    Check-Up    Hypertension    Shortness of Breath    Hyperlipidemia   Know dyspnea  Exertional   Seen pulmonary   Mild COPD and does have sleep apnea  Partially treated sleep apnea  Know moderate CAD  Cath 2021  Previous stent   Known pacer as well  Some chest discomfort  Some dizziness  No syncope  BP is stable  On medical RX  On statins for hyperlipidemia        HPI:  HPI  Past Medical History:   Diagnosis Date    Arthritis     ASHD (arteriosclerotic heart disease) 01/21/2013    Eructation 08/14/2014    GERD (gastroesophageal reflux disease)     Hyperlipidemia     Medtronic dual pacer  9/27/2023    Mild chronic obstructive pulmonary disease (HCC) 6/27/2023    Sepsis due to urinary tract infection (HCC) 02/25/2022    Splenomegaly 09/15/2014      Past Surgical History:   Procedure Laterality Date    APPENDECTOMY  09/22/2016    Dr. De La Cruz    COLONOSCOPY  07/26/2016    Eric Riddle    CORONARY ANGIOPLASTY WITH STENT PLACEMENT  2013    2 stents lad    FINGER SURGERY      PACEMAKER PLACEMENT  2022     Family History   Problem Relation Age of Onset    Heart Disease Father 48        MI    Stroke Father     Heart Disease Brother 63        STENTS    Arthritis Sister     Heart Disease Brother     Heart Disease Brother     No Known Problems Brother     No Known Problems Brother     No Known Problems Brother     No Known Problems Sister     No Known Problems Sister     No Known Problems Sister      Social History     Tobacco Use    Smoking status: Never    Smokeless tobacco: Never   Substance Use Topics    Alcohol use: Yes     Alcohol/week: 1.0 standard drink of alcohol     Types: 1 Glasses of wine per week

## 2024-04-29 NOTE — PROGRESS NOTES
Patient here for check up.  EKG done today.  Patient complains of getting lightheaded when standing up.   Look at download from 4-3-24.

## 2024-04-29 NOTE — TELEPHONE ENCOUNTER
Patient scheduled for a CYSTOSCOPY, GREENLIGHT PHOTO VAPORIZATION OF PROSTATE with Dr. Wheeler on 6/4/2024. We are asking for clearance.

## 2024-04-29 NOTE — TELEPHONE ENCOUNTER
DO NOT TAKE  FISH OIL, MOBIC, IBUPROFEN, MOTRIN-LIKE DRUGS AND ANY MULTIVITAMINS OR OVER THE COUNTER SUPPLEMENTS 14 DAYS PRIOR TO SURGERY.    HOLD ASPIRIN 5 DAYS PRIOR TO SURGERY    MUST HAVE AN ADULT OVER THE AGE OF 18 WITH YOU AT THE TIME OF THE DISCHARGE         Vinicio Zacarias 1950     Surgical Physician: Dr. Wheeler      You have been scheduled for the procedure marked below:      Surgery: CYSTOSCOPY, GREENLIGHT PHOTO VAPORIZATION OF PROSTATE         Date: 06/04/2024     Anesthesia:  General     Place of Service: [facility] --Second Floor Same Day Surgery         Arrive to same day surgery at:  9:00AM  (Surgery time is subject to change)      INSTRUCTIONS AS MARKED BELOW:    1.  DO NOT eat or drink anything after midnight before surgery.  2.  We prefer you shower or bathe with an antibacterial soap (Dial) the morning of surgery.  3  Please bring a current medication list, photo ID and insurance card(s) with you  4. Okay to take Tylenol  5. Take blood pressure or heart medication as directed, if taken in the morning take with a small sip of water  6.The office will call you in 1-2 days after your procedure to schedule a follow up.    DATE SENSITIVE PRE OP TESTING:    TO AVOID YOUR SURGERY BEING CANCELLED DO ON THE DATE LISTED *WALK IN *NO APPOINTMENT.      DO URINE CULTURE ON 5/21/2024; ORDERS INCLUDED        Date: 4/29/2024

## 2024-04-29 NOTE — TELEPHONE ENCOUNTER
SURGERY SCHEDULING FORM   [place] [address]      Phone *[phone] *[phone]   Surgical Scheduling Direct Line Phone *[phone] Fax *[phone]      Vinicio JOSÉ Deann 1950 male    [address]  [facility] [address]   Marital Status:          Home Phone: [phone]      Cell Phone:    Telephone Information:   Mobile [phone]          Surgeon: Dr. Wheeler       Surgery Date: 6/4/2024       Time: 10:00am    Procedure: Cystoscopy, Greenlight Photo Vaporization of Prostate    Diagnosis: BPH     Important Medical History:  In [facility]    Special Inst/Equip: [employer] conf#[id number]    CPT Codes:    74596  Latex Allergy: No     Cardiac Device:  No    Anesthesia:  General          Admission Type:  Same Day                        Admit Prior to Day of Surgery: No    Case Location:  Main OR            Preadmission Testing:  Phone Call          PAT Date and Time:______________________________________________________    PAT Confirmation #: ______________________________________________________    Post Op Visit: ___________________________________________________________    Need Preop Cardiac Clearance: Yes    Does Patient have Cardiologist/physician?     Dr. Early    Surgery Confirmation #: __________________________________________________    : ________________________   Date: __________________________     Insurance Company Name: [facility]

## 2024-04-29 NOTE — PROGRESS NOTES
Cystoscopy    Operative Note    Patient:  Vinicio Zacarias  MRN: [id number]  YOB: 1950    Date: 04/29/24  Surgeon: LULY VIZCARRA MD  Anesthesia: Urojet Local  Indications: bph recurrent uti  Position: Supine  EBL: 0 ml    Findings:   The patient was prepped and draped in the usual sterile fashion.  The flexible cystoscope was advanced through the urethra and into the bladder.  The bladder was thoroughly inspected and the following was noted:    Residual Urine: moderate.  Urine clear, with no obvious infection  Urethra: No abnormalities of the urethra are noted. Urethral dilation was none performed.    Prostate: lateral lobe hypertrophy ++ present, prostate obstructing, intravesical extension of prostate was present. There was no previous TURP defect.   Bladder: no tumor noted .   Moderate trabeculation noted.  no bladder diverticulum.  Ureters: Orifices with normal configuration and location.      The cystoscope was removed.  The patient tolerated the procedure well.  Large prostate (35) discussed PVP r/b/a discussed

## 2024-04-29 NOTE — TELEPHONE ENCOUNTER
Patient is scheduled for surgery with  on 6/4/2024. Surgery consent to be done on arrival. Dr. JONES to clear. Patient to do pre op URINE CULTURE ON 5/21/2024. Surgery instructions gone over with patient verbally in the office     Patient informed an adult over the age of 18 must be with them at the time of surgery and upon discharge    [facility] conf#[id number]

## 2024-04-30 DIAGNOSIS — N40.1 BENIGN LOCALIZED PROSTATIC HYPERPLASIA WITH LOWER URINARY TRACT SYMPTOMS (LUTS): Primary | ICD-10-CM

## 2024-04-30 DIAGNOSIS — Z01.818 PRE-OP TESTING: ICD-10-CM

## 2024-05-06 ENCOUNTER — HOSPITAL ENCOUNTER (OUTPATIENT)
Dept: NUCLEAR MEDICINE | Age: 74
Discharge: HOME | End: 2024-05-06
Attending: NUCLEAR MEDICINE
Payer: MEDICARE

## 2024-05-06 ENCOUNTER — HOSPITAL ENCOUNTER (OUTPATIENT)
Age: 74
Discharge: HOME | End: 2024-05-08
Attending: NUCLEAR MEDICINE
Payer: MEDICARE

## 2024-05-06 ENCOUNTER — TELEPHONE (OUTPATIENT)
Dept: CARDIOLOGY CLINIC | Age: 74
End: 2024-05-06

## 2024-05-06 VITALS — WEIGHT: 200 LBS | HEIGHT: 70 IN | BODY MASS INDEX: 28.63 KG/M2

## 2024-05-06 DIAGNOSIS — I49.1 PAC (PREMATURE ATRIAL CONTRACTION): ICD-10-CM

## 2024-05-06 DIAGNOSIS — Z95.0 PACEMAKER: ICD-10-CM

## 2024-05-06 DIAGNOSIS — I25.10 ASHD (ARTERIOSCLEROTIC HEART DISEASE): ICD-10-CM

## 2024-05-06 LAB
ECHO BSA: 2.12 M2
NUC STRESS EJECTION FRACTION: 44 %
STRESS BASELINE DIAS BP: 72 MMHG
STRESS BASELINE HR: 69 BPM
STRESS BASELINE SYS BP: 127 MMHG
STRESS EXERCISE DUR MIN: 3 MIN
STRESS EXERCISE DUR SEC: 0 SEC
STRESS PEAK DIAS BP: 64 MMHG
STRESS PEAK SYS BP: 142 MMHG
STRESS PERCENT HR ACHIEVED: 58 %
STRESS POST PEAK HR: 84 BPM
STRESS RATE PRESSURE PRODUCT: NORMAL BPM*MMHG
STRESS STAGE 1 BP: NORMAL MMHG
STRESS STAGE 1 DURATION: 1 MIN:SEC
STRESS STAGE 1 HR: 70 BPM
STRESS STAGE 2 BP: NORMAL MMHG
STRESS STAGE 2 DURATION: 1 MIN:SEC
STRESS STAGE 2 HR: 70 BPM
STRESS STAGE 3 BP: NORMAL MMHG
STRESS STAGE 3 DURATION: 1 MIN:SEC
STRESS STAGE 3 HR: 72 BPM
STRESS STAGE RECOVERY 1 BP: NORMAL MMHG
STRESS STAGE RECOVERY 1 DURATION: 1 MIN:SEC
STRESS STAGE RECOVERY 1 HR: 60 BPM
STRESS STAGE RECOVERY 2 BP: NORMAL MMHG
STRESS STAGE RECOVERY 2 DURATION: 1 MIN:SEC
STRESS STAGE RECOVERY 2 HR: 60 BPM
STRESS STAGE RECOVERY 3 BP: NORMAL MMHG
STRESS STAGE RECOVERY 3 DURATION: 1 MIN:SEC
STRESS STAGE RECOVERY 3 HR: 60 BPM
STRESS STAGE RECOVERY 4 BP: NORMAL MMHG
STRESS STAGE RECOVERY 4 DURATION: 1 MIN:SEC
STRESS STAGE RECOVERY 4 HR: 60 BPM
STRESS TARGET HR: 144 BPM

## 2024-05-06 PROCEDURE — A9500 TC99M SESTAMIBI: HCPCS | Performed by: NUCLEAR MEDICINE

## 2024-05-06 PROCEDURE — 6360000002 HC RX W HCPCS: Performed by: NUCLEAR MEDICINE

## 2024-05-06 PROCEDURE — 78452 HT MUSCLE IMAGE SPECT MULT: CPT

## 2024-05-06 PROCEDURE — 93016 CV STRESS TEST SUPVJ ONLY: CPT | Performed by: NUCLEAR MEDICINE

## 2024-05-06 PROCEDURE — 93018 CV STRESS TEST I&R ONLY: CPT | Performed by: NUCLEAR MEDICINE

## 2024-05-06 PROCEDURE — 78452 HT MUSCLE IMAGE SPECT MULT: CPT | Performed by: NUCLEAR MEDICINE

## 2024-05-06 PROCEDURE — 3430000000 HC RX DIAGNOSTIC RADIOPHARMACEUTICAL: Performed by: NUCLEAR MEDICINE

## 2024-05-06 PROCEDURE — 93017 CV STRESS TEST TRACING ONLY: CPT

## 2024-05-06 RX ORDER — REGADENOSON 0.08 MG/ML
0.4 INJECTION, SOLUTION INTRAVENOUS
Status: COMPLETED | OUTPATIENT
Start: 2024-05-06 | End: 2024-05-06

## 2024-05-06 RX ORDER — TETRAKIS(2-METHOXYISOBUTYLISOCYANIDE)COPPER(I) TETRAFLUOROBORATE 1 MG/ML
31.9 INJECTION, POWDER, LYOPHILIZED, FOR SOLUTION INTRAVENOUS
Status: COMPLETED | OUTPATIENT
Start: 2024-05-06 | End: 2024-05-06

## 2024-05-06 RX ORDER — TETRAKIS(2-METHOXYISOBUTYLISOCYANIDE)COPPER(I) TETRAFLUOROBORATE 1 MG/ML
9.5 INJECTION, POWDER, LYOPHILIZED, FOR SOLUTION INTRAVENOUS
Status: COMPLETED | OUTPATIENT
Start: 2024-05-06 | End: 2024-05-06

## 2024-05-06 RX ADMIN — REGADENOSON 0.4 MG: 0.08 INJECTION, SOLUTION INTRAVENOUS at 08:13

## 2024-05-06 RX ADMIN — Medication 9.5 MILLICURIE: at 07:24

## 2024-05-06 RX ADMIN — Medication 31.9 MILLICURIE: at 08:14

## 2024-05-06 NOTE — TELEPHONE ENCOUNTER
Pt stopped by desk after stress test and would like a call with the results after Dr. Early reviews testing

## 2024-05-06 NOTE — TELEPHONE ENCOUNTER
Stress test done 5-6-24 with Dr. Early.  Stress test not read yet.  Please call pt once results are read.

## 2024-05-07 NOTE — TELEPHONE ENCOUNTER
Separate encounter sent to Dr Early on stress test and clearance for surgery. Closing this encounter.

## 2024-05-08 ENCOUNTER — NURSE ONLY (OUTPATIENT)
Dept: CARDIOLOGY CLINIC | Age: 74
End: 2024-05-08
Payer: MEDICARE

## 2024-05-08 ENCOUNTER — TELEPHONE (OUTPATIENT)
Dept: CARDIOLOGY CLINIC | Age: 74
End: 2024-05-08

## 2024-05-08 ENCOUNTER — OFFICE VISIT (OUTPATIENT)
Dept: CARDIOLOGY CLINIC | Age: 74
End: 2024-05-08
Payer: MEDICARE

## 2024-05-08 VITALS
WEIGHT: 204.6 LBS | BODY MASS INDEX: 29.29 KG/M2 | HEIGHT: 70 IN | DIASTOLIC BLOOD PRESSURE: 64 MMHG | OXYGEN SATURATION: 97 % | SYSTOLIC BLOOD PRESSURE: 106 MMHG | HEART RATE: 58 BPM

## 2024-05-08 DIAGNOSIS — Z95.0 PACEMAKER: Primary | ICD-10-CM

## 2024-05-08 DIAGNOSIS — R00.1 BRADYCARDIA: ICD-10-CM

## 2024-05-08 PROCEDURE — 1123F ACP DISCUSS/DSCN MKR DOCD: CPT | Performed by: INTERNAL MEDICINE

## 2024-05-08 PROCEDURE — 93288 INTERROG EVL PM/LDLS PM IP: CPT | Performed by: INTERNAL MEDICINE

## 2024-05-08 PROCEDURE — 93000 ELECTROCARDIOGRAM COMPLETE: CPT | Performed by: INTERNAL MEDICINE

## 2024-05-08 PROCEDURE — G8417 CALC BMI ABV UP PARAM F/U: HCPCS | Performed by: INTERNAL MEDICINE

## 2024-05-08 PROCEDURE — G8427 DOCREV CUR MEDS BY ELIG CLIN: HCPCS | Performed by: INTERNAL MEDICINE

## 2024-05-08 PROCEDURE — 99214 OFFICE O/P EST MOD 30 MIN: CPT | Performed by: INTERNAL MEDICINE

## 2024-05-08 PROCEDURE — 1036F TOBACCO NON-USER: CPT | Performed by: INTERNAL MEDICINE

## 2024-05-08 NOTE — PROGRESS NOTES
Dr brock pt / pt here today to see DR Jacobs  Battery 12 yrs remaining    Aair<=>dddr     A paced 86.5%  V paced 0.3%    Pt referred to dr jacobs for pvc's    Pvc burden 416.2 per hour and 13.9 runs since 9/27/23  Presents in apvs  Underlying asvs very slow    No dx of afib on chart and no oacs  10/5/23 episode of afib and none since  Afib burden <0.1%      P waves 2.8  Rv waves 7.5    Atrial impedence 380  Vent impedence 494    Atrial threshold 0.5 @ 0.4  Vent threshold 0.75 @ 0.4    Atrial amplitude 1.5 @ 0.4  Vent amplitude 2 @ 0.4

## 2024-05-08 NOTE — TELEPHONE ENCOUNTER
After patient left his appointment today, Dr De La Paz states he would like a repeat holter in 6 month and follow up after.    Order placed.    Called and spoke to patient, appointments scheduled with patient.    He leaves for [place] in early Dec so would like all of this arranged well in advance. Appointments scheduled in October.

## 2024-05-08 NOTE — PROGRESS NOTES
[facility]   Heart Center (EP)  [address] [address]  Dept: [phone]  Cardiac Electrophysiology: Consultation Note  Patient's demographics:  Date:   5/8/2024  Patient name:              Vinicio Zacarias  YOB: 1950  Sex:    male   MRN:   [id number]    Primary Care Physician:  Luc Hearn MD    Cardiologist:  Piter Cortez MD    Reason for Consultation:  Premature ventricular complexes.     Clinical Summary:  74/M has been experiencing intermittent dizziness when he sanket forward and gets up.  Last for few seconds and resolved.  No syncope, chest pain, palpitation, lower extremity swelling.  Had a recent stress test that was negative for myocardial ischemia, LVEF 45% (echocardiogram done in [place] she will an EF of 55%.  He is pacemaker interrogation showed low burden PVCs and NSVT, longest 22 seconds).  Referred here for further management. Medical history: SSS/Medtronic DCPM (2022), CAD/PCI-LAD (1/2013), hypertension, hyperlipidemia, GERD and EGD.    Review of systems:  Constitutional: Negative for chills and fever  HENT: Negative for congestion, sinus pressure, sneezing and sore throat.    Eyes: Negative for pain, discharge, redness and itching.   Respiratory: Negative for apnea, cough  Gastrointestinal: Negative for blood in stool, constipation, diarrhea   Endocrine: Negative for cold intolerance, heat intolerance, polydipsia.  Genitourinary: Negative for dysuria, enuresis, flank pain and hematuria.   Musculoskeletal: Negative for arthralgias, joint swelling and neck pain.   Neurological: Negative for numbness and headaches.   Psychiatric/Behavioral: Negative for agitation, confusion, decreased concentration and dysphoric mood.      Past Medical History::  Past Medical History:   Diagnosis Date    Arthritis     ASHD (arteriosclerotic heart disease) 01/21/2013    Eructation 08/14/2014    GERD (gastroesophageal reflux disease)

## 2024-05-08 NOTE — PATIENT INSTRUCTIONS
You may receive a survey regarding the care you received during your visit.  Your input is valuable to us.  We encourage you to complete and return your survey.  We hope you will choose us in the future for your healthcare needs.    Thank you for choosing [facility]!    Your Medical Assistant Today:  Parisa KEENAN      Your RN Today:  Jesenia KEENAN  Your Provider for Today: Dr. De La Paz  Ph. [phone]     Have a Great Day!

## 2024-05-29 ENCOUNTER — HOSPITAL ENCOUNTER (OUTPATIENT)
Dept: PULMONOLOGY | Age: 74
Discharge: HOME | End: 2024-05-29
Attending: INTERNAL MEDICINE
Payer: MEDICARE

## 2024-05-29 DIAGNOSIS — G47.19 EXCESSIVE DAYTIME SLEEPINESS: ICD-10-CM

## 2024-05-29 DIAGNOSIS — R06.09 DOE (DYSPNEA ON EXERTION): ICD-10-CM

## 2024-05-29 DIAGNOSIS — J44.9 STAGE 1 MILD COPD BY GOLD CLASSIFICATION (HCC): ICD-10-CM

## 2024-05-29 PROCEDURE — 94729 DIFFUSING CAPACITY: CPT

## 2024-05-29 PROCEDURE — 94060 EVALUATION OF WHEEZING: CPT

## 2024-05-29 PROCEDURE — 94726 PLETHYSMOGRAPHY LUNG VOLUMES: CPT

## 2024-06-03 RX ORDER — TAMSULOSIN HYDROCHLORIDE 0.4 MG/1
0.4 CAPSULE ORAL DAILY
Qty: 90 CAPSULE | Refills: 3 | Status: SHIPPED | OUTPATIENT
Start: 2024-06-03

## 2024-06-03 NOTE — TELEPHONE ENCOUNTER
Vinicio Zacarias called requesting a refill on the following medications:  Requested Prescriptions     Pending Prescriptions Disp Refills    tamsulosin (FLOMAX) 0.4 MG capsule [Pharmacy Med Name: Tamsulosin HCl 0.4 MG Oral Capsule] 90 capsule 3     Sig: TAKE 1 CAPSULE BY MOUTH DAILY     Pharmacy verified:  [email or web address]      Date of last visit: 04/29/2024  Date of next visit (if applicable): surgery 08/02/2024

## 2024-06-12 ENCOUNTER — OFFICE VISIT (OUTPATIENT)
Dept: PULMONOLOGY | Age: 74
End: 2024-06-12
Payer: MEDICARE

## 2024-06-12 VITALS
SYSTOLIC BLOOD PRESSURE: 118 MMHG | HEIGHT: 70 IN | OXYGEN SATURATION: 98 % | DIASTOLIC BLOOD PRESSURE: 72 MMHG | WEIGHT: 208 LBS | TEMPERATURE: 98.2 F | HEART RATE: 82 BPM | BODY MASS INDEX: 29.78 KG/M2

## 2024-06-12 DIAGNOSIS — G47.33 OSA ON CPAP: ICD-10-CM

## 2024-06-12 DIAGNOSIS — J44.9 STAGE 1 MILD COPD BY GOLD CLASSIFICATION (HCC): Primary | ICD-10-CM

## 2024-06-12 PROCEDURE — 1123F ACP DISCUSS/DSCN MKR DOCD: CPT | Performed by: INTERNAL MEDICINE

## 2024-06-12 PROCEDURE — 99213 OFFICE O/P EST LOW 20 MIN: CPT | Performed by: INTERNAL MEDICINE

## 2024-06-12 PROCEDURE — 1036F TOBACCO NON-USER: CPT | Performed by: INTERNAL MEDICINE

## 2024-06-12 PROCEDURE — 3023F SPIROM DOC REV: CPT | Performed by: INTERNAL MEDICINE

## 2024-06-12 PROCEDURE — G8427 DOCREV CUR MEDS BY ELIG CLIN: HCPCS | Performed by: INTERNAL MEDICINE

## 2024-06-12 PROCEDURE — G8417 CALC BMI ABV UP PARAM F/U: HCPCS | Performed by: INTERNAL MEDICINE

## 2024-06-12 ASSESSMENT — ENCOUNTER SYMPTOMS
ALLERGIC/IMMUNOLOGIC NEGATIVE: 1
EYES NEGATIVE: 1
SHORTNESS OF BREATH: 0
CHOKING: 0
STRIDOR: 0
VOICE CHANGE: 0
COUGH: 0
GASTROINTESTINAL NEGATIVE: 1
APNEA: 0
WHEEZING: 0
CHEST TIGHTNESS: 0

## 2024-06-12 NOTE — PROGRESS NOTES
Subjective:      Patient ID: Vinicio Zacarias is a 74 y.o. male.      CC: SOB      HPI      Viet comes for follow-up with a spirometry which confirms COPD Gold classification 1 with a postbronchodilator FEV1 of 2.47 L, there is good response to bronchodilators will improve in FEV1 by 12%.    Viet never smoked, his alpha-1 antitrypsin genetics is MM  He does have some albuterol samples which I provided however he has not needed or use it, unclear the reason for mild COPD could be related to occupation, passive smoking, the obstructive impairment seems to be stable and nonprogressive as per serial spirometry.    Polysomnogram revealed obstructive sleep apnea and currently good compliance with PAP therapies        Previous notes  High-resolution CT scan of the chest is reassuring there is no active cardiopulmonary disease and there is no evidence of interstitial lung disease which was my concern.  Alpha-1 antitrypsin swab is pending  Type III polysomnogram is to be done this coming Monday for that Viet will be follow-up in the sleep clinic to review results  Provided Combivent for a therapeutic trial was of no benefit for Viet he did not experience any decrease in his shortness of breath  Talking to Viet and his wife he remains very active during the day working around the house however when he bends forward or to  something from the floor or to tie his shoes he transit currently will become dyspneic and he associates this to his weight gain and increase in belly fat.  During physical exam today I could not  any pulmonary rails that I heard last visit.      Viet comes with his wife referred by Dr. Early, Viet had an abnormal set of pulmonary function test revealing COPD stage I with air trapping and a mild reduction in diffusion capacity.  Viet is a never smoker has been complaining for dyspnea on exertion for the last couple of years also dizziness and some orthostatic symptoms, Beil is comfortable at

## 2024-06-12 NOTE — PROGRESS NOTES
Neck Circumference -  15.5  Mallampati - 2    Lung Nodule Screening     [] Qualifies    [x] Does not qualify   [] Declined    [] Completed

## 2024-07-02 ENCOUNTER — NURSE ONLY (OUTPATIENT)
Dept: LAB | Age: 74
End: 2024-07-02

## 2024-07-02 DIAGNOSIS — R31.29 MICROHEMATURIA: ICD-10-CM

## 2024-07-02 DIAGNOSIS — N40.1 BENIGN LOCALIZED PROSTATIC HYPERPLASIA WITH LOWER URINARY TRACT SYMPTOMS (LUTS): ICD-10-CM

## 2024-07-02 DIAGNOSIS — R97.20 ELEVATED PSA: ICD-10-CM

## 2024-07-02 DIAGNOSIS — Z01.818 PRE-OP TESTING: ICD-10-CM

## 2024-07-02 LAB
ANION GAP SERPL CALC-SCNC: 10 MEQ/L (ref 8–16)
BASOPHILS ABSOLUTE: 0 THOU/MM3 (ref 0–0.1)
BASOPHILS NFR BLD AUTO: 0.8 %
BUN SERPL-MCNC: 26 MG/DL (ref 7–22)
CALCIUM SERPL-MCNC: 9.1 MG/DL (ref 8.5–10.5)
CHLORIDE SERPL-SCNC: 103 MEQ/L (ref 98–111)
CO2 SERPL-SCNC: 28 MEQ/L (ref 23–33)
CREAT SERPL-MCNC: 1.3 MG/DL (ref 0.4–1.2)
DEPRECATED RDW RBC AUTO: 41.6 FL (ref 35–45)
EOSINOPHIL NFR BLD AUTO: 3.4 %
EOSINOPHILS ABSOLUTE: 0.2 THOU/MM3 (ref 0–0.4)
ERYTHROCYTE [DISTWIDTH] IN BLOOD BY AUTOMATED COUNT: 13.6 % (ref 11.5–14.5)
GFR SERPL CREATININE-BSD FRML MDRD: 57 ML/MIN/1.73M2
GLUCOSE SERPL-MCNC: 92 MG/DL (ref 70–108)
HCT VFR BLD AUTO: 40 % (ref 42–52)
HGB BLD-MCNC: 12.9 GM/DL (ref 14–18)
IMM GRANULOCYTES # BLD AUTO: 0.01 THOU/MM3 (ref 0–0.07)
IMM GRANULOCYTES NFR BLD AUTO: 0.2 %
LYMPHOCYTES ABSOLUTE: 1.8 THOU/MM3 (ref 1–4.8)
LYMPHOCYTES NFR BLD AUTO: 39 %
MCH RBC QN AUTO: 27.6 PG (ref 26–33)
MCHC RBC AUTO-ENTMCNC: 32.3 GM/DL (ref 32.2–35.5)
MCV RBC AUTO: 85.5 FL (ref 80–94)
MONOCYTES ABSOLUTE: 0.4 THOU/MM3 (ref 0.4–1.3)
MONOCYTES NFR BLD AUTO: 7.6 %
NEUTROPHILS ABSOLUTE: 2.3 THOU/MM3 (ref 1.8–7.7)
NEUTROPHILS NFR BLD AUTO: 49 %
NRBC BLD AUTO-RTO: 0 /100 WBC
PLATELET # BLD AUTO: 113 THOU/MM3 (ref 130–400)
PMV BLD AUTO: 10.6 FL (ref 9.4–12.4)
POTASSIUM SERPL-SCNC: 4.1 MEQ/L (ref 3.5–5.2)
RBC # BLD AUTO: 4.68 MILL/MM3 (ref 4.7–6.1)
SODIUM SERPL-SCNC: 141 MEQ/L (ref 135–145)
WBC # BLD AUTO: 4.7 THOU/MM3 (ref 4.8–10.8)

## 2024-07-05 ENCOUNTER — PREP FOR PROCEDURE (OUTPATIENT)
Dept: UROLOGY | Age: 74
End: 2024-07-05

## 2024-07-05 RX ORDER — SODIUM CHLORIDE 0.9 % (FLUSH) 0.9 %
5-40 SYRINGE (ML) INJECTION EVERY 12 HOURS SCHEDULED
Status: CANCELLED | OUTPATIENT
Start: 2024-07-05

## 2024-07-05 RX ORDER — SODIUM CHLORIDE 0.9 % (FLUSH) 0.9 %
5-40 SYRINGE (ML) INJECTION PRN
Status: CANCELLED | OUTPATIENT
Start: 2024-07-05

## 2024-07-05 RX ORDER — SODIUM CHLORIDE 9 MG/ML
INJECTION, SOLUTION INTRAVENOUS PRN
Status: CANCELLED | OUTPATIENT
Start: 2024-07-05

## 2024-07-10 ENCOUNTER — PROCEDURE VISIT (OUTPATIENT)
Dept: CARDIOLOGY CLINIC | Age: 74
End: 2024-07-10
Payer: MEDICARE

## 2024-07-10 ENCOUNTER — TELEPHONE (OUTPATIENT)
Dept: UROLOGY | Age: 74
End: 2024-07-10

## 2024-07-10 DIAGNOSIS — Z95.0 PACEMAKER: Primary | ICD-10-CM

## 2024-07-10 PROCEDURE — 93294 REM INTERROG EVL PM/LDLS PM: CPT | Performed by: NUCLEAR MEDICINE

## 2024-07-10 PROCEDURE — 93296 REM INTERROG EVL PM/IDS: CPT | Performed by: NUCLEAR MEDICINE

## 2024-07-10 NOTE — TELEPHONE ENCOUNTER
Patient is scheduled for CYSTO WITH GREENLIGHT PHOTOVAPORIZATION OF THE PROSTATE on 08/02/2024. He had his labs completed and wanted to make sure his blood counts were ok for surgery.     He has had problems with his platelets in the past. He is not seeing a hematologist currently.

## 2024-07-10 NOTE — TELEPHONE ENCOUNTER
Platelet of 113 reviewed with surgeon. Matthew per Dr. James Wheeler.      The patient should go to the ED should they develop fever, chills, nausea, vomiting, chest pain, SOB, calf pain, feelings of incomplete emptying, or should they otherwise feel they need evaluated

## 2024-07-10 NOTE — PROGRESS NOTES
[employer] Medtronic Dual Pacemaker   Patient of [facility]    Battery 11.8 years    Presenting rhythm AP VS    A Impedance 342  RV Impedance 456    P wave sensing 2.8  R wave sensing 6.9    A Threshold 0.5 @ 0.4  A Amplitude 1.5 @ 0.4  RV Thresholds 0.625 @ 0.4  RV Amplitude 2 @ 0.4      A Paced 89.1%  V Paced 1.3%    Programmed Mode AAIR <=> DDDR       Afib [place] <0.1%    Episodes  NS VT

## 2024-07-10 NOTE — TELEPHONE ENCOUNTER
Patient advised lab results were reviewed and ok to proceed with surgery. He voiced understanding.

## 2024-07-25 LAB — URINE CULTURE, ROUTINE: NORMAL

## 2024-07-29 NOTE — PROGRESS NOTES
PAT call completed. Home medications reviewed and pharmacy updated. Patient holding ASA and multivitamin. Patient states he takes all meds in evening and instructed to take day before surgery as prescribed. Patient verbalized understanding and has no concerns or questions.

## 2024-07-29 NOTE — PROGRESS NOTES
Follow all instructions given by your physician  Do not eat or drink anything after midnight prior to surgery(includes water, chewing gum, mints and ice chips)  Sips of water am of surgery with allowed medications  May brush teeth   Do not smoke or chew tobacco, drink alcoholic beverages or use any illicit drugs for 24 hours prior to surgery  Bring insurance info and photo ID  Bring pertinent paperwork with you from Doctor or surgeons's office  Wear clean comfortable, loose-fitting clothing  No make-up, nail polish, jewelry, piercings, or contact lenses to be worn day of surgery  No glue on dentures morning of surgery; you will be asked to remove them for surgery. Case for glasses.  Shower the night before and the morning of surgery with cleansing soap provided or a liquid antibacterial soap, dry with new fresh clean towel after each shower, no lotions, creams or powder.  Clean sheets and pillowcase on bed night before surgery  Bring medications in original bottles, Bring rescue inhalers with you  Bring CPAP/BIPAP machine  Do you have a DNR? No DNR, patient thinks he has ACPs but not on file. Patient states his wife handles all that. Recommended him to bring Healthcare Directive or Healthcare Power of  in so we can scan it into your chart.    Our pharmacy has a Meds to Beds program where they will deliver any new prescriptions you may have to your room before you leave. Our Pharmacy will clear it through your insurance; for example (same co pay). This enables you to take your new RX as soon as you need when you get home and avoids stop/wait delays on the way home.  Please have a form of payment with you and have someone designated as your Pharmacy contact with their phone # as you may not feel well or still be under the influence of anesthesia.    Please refer to the SSI-Surgical Site Infection Flyer you hopefully received in the mail-together we can prevent infections; signs and symptoms reviewed.  When  discharged be sure you understand how to care for your wound and that you have the Dr./office phone # to call if you have any concerns or questions about your wound.     needed at discharge and someone over 18 to stay with you for 24 hours overnight (surgery may be cancelled if you don't have this)  Report to \[facility]\"" on 2nd floor  If you would become ill prior to surgery, please call the surgeon  May have a visitor with you, we request that you limit to 2 visitors in pre-op area  Masks are recommended but not required, new masks at entrance desk  Call [phone] for any questions

## 2024-08-02 ENCOUNTER — HOSPITAL ENCOUNTER (OUTPATIENT)
Age: 74
Setting detail: OUTPATIENT SURGERY
Discharge: HOME | End: 2024-08-02
Attending: UROLOGY | Admitting: UROLOGY
Payer: MEDICARE

## 2024-08-02 ENCOUNTER — ANESTHESIA EVENT (OUTPATIENT)
Dept: OPERATING ROOM | Age: 74
End: 2024-08-02
Payer: MEDICARE

## 2024-08-02 ENCOUNTER — ANESTHESIA (OUTPATIENT)
Dept: OPERATING ROOM | Age: 74
End: 2024-08-02
Payer: MEDICARE

## 2024-08-02 VITALS
HEART RATE: 60 BPM | RESPIRATION RATE: 20 BRPM | SYSTOLIC BLOOD PRESSURE: 126 MMHG | TEMPERATURE: 96.9 F | HEIGHT: 69 IN | OXYGEN SATURATION: 99 % | BODY MASS INDEX: 30.07 KG/M2 | DIASTOLIC BLOOD PRESSURE: 74 MMHG | WEIGHT: 203 LBS

## 2024-08-02 DIAGNOSIS — G89.18 POSTOPERATIVE PAIN: Primary | ICD-10-CM

## 2024-08-02 PROCEDURE — 2500000003 HC RX 250 WO HCPCS: Performed by: NURSE ANESTHETIST, CERTIFIED REGISTERED

## 2024-08-02 PROCEDURE — 6360000002 HC RX W HCPCS: Performed by: UROLOGY

## 2024-08-02 PROCEDURE — 7100000001 HC PACU RECOVERY - ADDTL 15 MIN: Performed by: UROLOGY

## 2024-08-02 PROCEDURE — 7100000000 HC PACU RECOVERY - FIRST 15 MIN: Performed by: UROLOGY

## 2024-08-02 PROCEDURE — 3600000013 HC SURGERY LEVEL 3 ADDTL 15MIN: Performed by: UROLOGY

## 2024-08-02 PROCEDURE — 2720000010 HC SURG SUPPLY STERILE: Performed by: UROLOGY

## 2024-08-02 PROCEDURE — 3700000000 HC ANESTHESIA ATTENDED CARE: Performed by: UROLOGY

## 2024-08-02 PROCEDURE — 7100000010 HC PHASE II RECOVERY - FIRST 15 MIN: Performed by: UROLOGY

## 2024-08-02 PROCEDURE — 7100000011 HC PHASE II RECOVERY - ADDTL 15 MIN: Performed by: UROLOGY

## 2024-08-02 PROCEDURE — 3600000003 HC SURGERY LEVEL 3 BASE: Performed by: UROLOGY

## 2024-08-02 PROCEDURE — 2709999900 HC NON-CHARGEABLE SUPPLY: Performed by: UROLOGY

## 2024-08-02 PROCEDURE — 2580000003 HC RX 258: Performed by: UROLOGY

## 2024-08-02 PROCEDURE — 3700000001 HC ADD 15 MINUTES (ANESTHESIA): Performed by: UROLOGY

## 2024-08-02 PROCEDURE — 6360000002 HC RX W HCPCS: Performed by: NURSE ANESTHETIST, CERTIFIED REGISTERED

## 2024-08-02 PROCEDURE — 2580000003 HC RX 258: Performed by: NURSE ANESTHETIST, CERTIFIED REGISTERED

## 2024-08-02 RX ORDER — DIPHENHYDRAMINE HYDROCHLORIDE 50 MG/ML
12.5 INJECTION INTRAMUSCULAR; INTRAVENOUS
Status: DISCONTINUED | OUTPATIENT
Start: 2024-08-02 | End: 2024-08-02 | Stop reason: HOSPADM

## 2024-08-02 RX ORDER — FENTANYL CITRATE 50 UG/ML
50 INJECTION, SOLUTION INTRAMUSCULAR; INTRAVENOUS EVERY 5 MIN PRN
Status: DISCONTINUED | OUTPATIENT
Start: 2024-08-02 | End: 2024-08-02 | Stop reason: HOSPADM

## 2024-08-02 RX ORDER — SODIUM CHLORIDE 0.9 % (FLUSH) 0.9 %
5-40 SYRINGE (ML) INJECTION PRN
Status: DISCONTINUED | OUTPATIENT
Start: 2024-08-02 | End: 2024-08-02 | Stop reason: HOSPADM

## 2024-08-02 RX ORDER — ACETAMINOPHEN 325 MG/1
650 TABLET ORAL ONCE
Status: DISCONTINUED | OUTPATIENT
Start: 2024-08-02 | End: 2024-08-02 | Stop reason: HOSPADM

## 2024-08-02 RX ORDER — ONDANSETRON 2 MG/ML
4 INJECTION INTRAMUSCULAR; INTRAVENOUS
Status: DISCONTINUED | OUTPATIENT
Start: 2024-08-02 | End: 2024-08-02 | Stop reason: HOSPADM

## 2024-08-02 RX ORDER — OXYCODONE HYDROCHLORIDE 5 MG/1
5 TABLET ORAL
Status: DISCONTINUED | OUTPATIENT
Start: 2024-08-02 | End: 2024-08-02 | Stop reason: HOSPADM

## 2024-08-02 RX ORDER — NALOXONE HYDROCHLORIDE 0.4 MG/ML
INJECTION, SOLUTION INTRAMUSCULAR; INTRAVENOUS; SUBCUTANEOUS PRN
Status: DISCONTINUED | OUTPATIENT
Start: 2024-08-02 | End: 2024-08-02 | Stop reason: HOSPADM

## 2024-08-02 RX ORDER — SODIUM CHLORIDE 9 MG/ML
INJECTION, SOLUTION INTRAVENOUS PRN
Status: DISCONTINUED | OUTPATIENT
Start: 2024-08-02 | End: 2024-08-02 | Stop reason: HOSPADM

## 2024-08-02 RX ORDER — LABETALOL HYDROCHLORIDE 5 MG/ML
10 INJECTION, SOLUTION INTRAVENOUS
Status: DISCONTINUED | OUTPATIENT
Start: 2024-08-02 | End: 2024-08-02 | Stop reason: HOSPADM

## 2024-08-02 RX ORDER — DEXTROSE MONOHYDRATE 100 MG/ML
INJECTION, SOLUTION INTRAVENOUS CONTINUOUS PRN
Status: DISCONTINUED | OUTPATIENT
Start: 2024-08-02 | End: 2024-08-02 | Stop reason: HOSPADM

## 2024-08-02 RX ORDER — HYDROCODONE BITARTRATE AND ACETAMINOPHEN 5; 325 MG/1; MG/1
1 TABLET ORAL EVERY 6 HOURS PRN
Qty: 12 TABLET | Refills: 0 | Status: SHIPPED | OUTPATIENT
Start: 2024-08-02 | End: 2024-08-07

## 2024-08-02 RX ORDER — HYDRALAZINE HYDROCHLORIDE 20 MG/ML
10 INJECTION INTRAMUSCULAR; INTRAVENOUS
Status: DISCONTINUED | OUTPATIENT
Start: 2024-08-02 | End: 2024-08-02 | Stop reason: HOSPADM

## 2024-08-02 RX ORDER — IPRATROPIUM BROMIDE AND ALBUTEROL SULFATE 2.5; .5 MG/3ML; MG/3ML
1 SOLUTION RESPIRATORY (INHALATION)
Status: DISCONTINUED | OUTPATIENT
Start: 2024-08-02 | End: 2024-08-02 | Stop reason: HOSPADM

## 2024-08-02 RX ORDER — LIDOCAINE HCL/PF 100 MG/5ML
SYRINGE (ML) INJECTION PRN
Status: DISCONTINUED | OUTPATIENT
Start: 2024-08-02 | End: 2024-08-02 | Stop reason: SDUPTHER

## 2024-08-02 RX ORDER — CEFDINIR 300 MG/1
300 CAPSULE ORAL 2 TIMES DAILY
Qty: 14 CAPSULE | Refills: 0 | Status: SHIPPED | OUTPATIENT
Start: 2024-08-02 | End: 2024-08-09

## 2024-08-02 RX ORDER — SODIUM CHLORIDE 0.9 % (FLUSH) 0.9 %
5-40 SYRINGE (ML) INJECTION EVERY 12 HOURS SCHEDULED
Status: DISCONTINUED | OUTPATIENT
Start: 2024-08-02 | End: 2024-08-02 | Stop reason: HOSPADM

## 2024-08-02 RX ORDER — KETOROLAC TROMETHAMINE 30 MG/ML
INJECTION, SOLUTION INTRAMUSCULAR; INTRAVENOUS PRN
Status: DISCONTINUED | OUTPATIENT
Start: 2024-08-02 | End: 2024-08-02 | Stop reason: SDUPTHER

## 2024-08-02 RX ORDER — GLUCAGON 1 MG/ML
1 KIT INJECTION PRN
Status: DISCONTINUED | OUTPATIENT
Start: 2024-08-02 | End: 2024-08-02 | Stop reason: HOSPADM

## 2024-08-02 RX ORDER — PROPOFOL 10 MG/ML
INJECTION, EMULSION INTRAVENOUS PRN
Status: DISCONTINUED | OUTPATIENT
Start: 2024-08-02 | End: 2024-08-02 | Stop reason: SDUPTHER

## 2024-08-02 RX ORDER — DROPERIDOL 2.5 MG/ML
0.62 INJECTION, SOLUTION INTRAMUSCULAR; INTRAVENOUS
Status: DISCONTINUED | OUTPATIENT
Start: 2024-08-02 | End: 2024-08-02 | Stop reason: HOSPADM

## 2024-08-02 RX ORDER — ONDANSETRON 2 MG/ML
INJECTION INTRAMUSCULAR; INTRAVENOUS PRN
Status: DISCONTINUED | OUTPATIENT
Start: 2024-08-02 | End: 2024-08-02 | Stop reason: SDUPTHER

## 2024-08-02 RX ORDER — FENTANYL CITRATE 50 UG/ML
INJECTION, SOLUTION INTRAMUSCULAR; INTRAVENOUS PRN
Status: DISCONTINUED | OUTPATIENT
Start: 2024-08-02 | End: 2024-08-02 | Stop reason: SDUPTHER

## 2024-08-02 RX ADMIN — Medication 100 MG: at 11:38

## 2024-08-02 RX ADMIN — PROPOFOL 200 MG: 10 INJECTION, EMULSION INTRAVENOUS at 11:39

## 2024-08-02 RX ADMIN — FENTANYL CITRATE 25 MCG: 50 INJECTION, SOLUTION INTRAMUSCULAR; INTRAVENOUS at 11:50

## 2024-08-02 RX ADMIN — KETOROLAC TROMETHAMINE 15 MG: 30 INJECTION, SOLUTION INTRAMUSCULAR at 11:51

## 2024-08-02 RX ADMIN — FENTANYL CITRATE 50 MCG: 50 INJECTION, SOLUTION INTRAMUSCULAR; INTRAVENOUS at 11:34

## 2024-08-02 RX ADMIN — SODIUM CHLORIDE 10 MCG: 9 INJECTION, SOLUTION INTRAVENOUS at 11:33

## 2024-08-02 RX ADMIN — WATER 2000 MG: 1 INJECTION INTRAMUSCULAR; INTRAVENOUS; SUBCUTANEOUS at 11:42

## 2024-08-02 RX ADMIN — FENTANYL CITRATE 25 MCG: 50 INJECTION, SOLUTION INTRAMUSCULAR; INTRAVENOUS at 12:09

## 2024-08-02 RX ADMIN — SODIUM CHLORIDE: 9 INJECTION, SOLUTION INTRAVENOUS at 10:55

## 2024-08-02 RX ADMIN — ONDANSETRON 8 MG: 2 INJECTION INTRAMUSCULAR; INTRAVENOUS at 11:41

## 2024-08-02 ASSESSMENT — PAIN - FUNCTIONAL ASSESSMENT
PAIN_FUNCTIONAL_ASSESSMENT: 0-10
PAIN_FUNCTIONAL_ASSESSMENT: FACE, LEGS, ACTIVITY, CRY, AND CONSOLABILITY (FLACC)

## 2024-08-02 ASSESSMENT — ENCOUNTER SYMPTOMS: SHORTNESS OF BREATH: 1

## 2024-08-02 ASSESSMENT — PAIN SCALES - GENERAL: PAINLEVEL_OUTOF10: 0

## 2024-08-02 NOTE — PROGRESS NOTES
Pt returned to [facility] room 11. Vitals and assessment as charted. 0.9 infusing, to count from PACU. Pt has crackers and water. Family at the bedside. Pt and family verbalized understanding of discharge criteria and call light use. Call light in reach.

## 2024-08-02 NOTE — BRIEF OP NOTE
Brief Postoperative Note      Patient: Vinicio Zacarias  YOB: 1950  MRN: [id number]    Date of Procedure: 8/2/2024    Pre-Op Diagnosis Codes:     * Benign prostatic hyperplasia, unspecified whether lower urinary tract symptoms present [N40.0]    Post-Op Diagnosis: Same       Procedure(s):  CYSTO WITH GREENLIGHT PHOTOVAPORIZATION OF THE PROSTATE    Surgeon(s):  Luly Vizcarra MD    Assistant:  * No surgical staff found *    Anesthesia: General    Estimated Blood Loss (mL): Minimal    Complications: None    Specimens:   * No specimens in log *    Implants:  * No implants in log *      Drains: * No LDAs found *    Findings:  Watters out Monday in office  2 week margot w pvr    Electronically signed by LULY VIZCARRA MD on 8/2/2024 at 11:38 AM

## 2024-08-02 NOTE — H&P
LULY VIZCARRA MD  History and Physical    Patient:  Vinicio Zacarias  MRN: [id number]  YOB: 1950    HISTORY OF PRESENT ILLNESS:     The patient is a 74 y.o. male who presents with bph. Here for procedure.    Patient's old records, notes and chart reviewed and summarized above.     LULY VIZCARRA MD independently reviewed the images and verified the radiology reports from:    No results found.      Past Medical History:    Past Medical History:   Diagnosis Date    Arthritis     ASHD (arteriosclerotic heart disease) 01/21/2013    Eructation 08/14/2014    GERD (gastroesophageal reflux disease)     Hyperlipidemia     Hypertension     Medtronic dual pacer  09/27/2023    Mild chronic obstructive pulmonary disease (HCC) 06/27/2023    Sepsis due to urinary tract infection (HCC) 02/25/2022    Splenomegaly 09/15/2014       Past Surgical History:    Past Surgical History:   Procedure Laterality Date    APPENDECTOMY  09/22/2016    Dr. De La Cruz    COLONOSCOPY  07/26/2016    Aimee Riddle    CORONARY ANGIOPLASTY WITH STENT PLACEMENT  2013    2 stents lad    ENDOSCOPY, COLON, DIAGNOSTIC      FINGER SURGERY      PACEMAKER PLACEMENT  2022       Medications Prior to Admission:    Prior to Admission medications    Medication Sig Start Date End Date Taking? Authorizing Provider   tamsulosin (FLOMAX) 0.4 MG capsule TAKE 1 CAPSULE BY MOUTH DAILY  Patient taking differently: Take 1 capsule by mouth every evening 6/3/24   Mychal Terrell PA-C   ibuprofen (ADVIL;MOTRIN) 200 MG tablet Take 1 tablet by mouth every 6 hours as needed for Pain    Provider, MD Stephanie   hydroCHLOROthiazide (HYDRODIURIL) 25 MG tablet Take 0.5 tablets by mouth daily  Patient taking differently: Take 0.5 tablets by mouth every evening 4/29/24   Rolo Cortez MD   metoprolol tartrate (LOPRESSOR) 25 MG tablet Take 1 tablet by mouth daily  Patient taking differently: Take 1 tablet by mouth every evening 4/29/24   Rolo Cortez    Problem Relation Age of Onset    Heart Attack Father     Heart Disease Father 48        MI    Stroke Father     Arthritis Sister     No Known Problems Sister     No Known Problems Sister     No Known Problems Sister     Heart Disease Brother 63        STENTS    Heart Disease Brother     Heart Disease Brother     No Known Problems Brother     No Known Problems Brother     No Known Problems Brother        REVIEW OF SYSTEMS:  Constitutional: negative  Eyes: negative  Respiratory: negative  Cardiovascular: negative  Gastrointestinal: negative  Genitourinary: no acute issues  Musculoskeletal: negative  Skin: negative   Neurological: negative  Hematological/Lymphatic: negative  Psychological: negative    Physical Exam:      No data found.  Constitutional: Patient in no acute distress;   Neuro: alert and oriented to person place and time.    Psych: Mood and affect normal.  Skin: Normal  Lungs: Respiratory effort normal, CTA  Cardiovascular:  Normal peripheral pulses; no murmur. Normal rhythm  Abdomen: Soft, non-tender, non-distended with no CVA, flank pain, hepatosplenomegaly or hernia.  Kidneys normal.  Bladder non-tender and not distended.      LABS:   No results for input(s): \"WBC\", \"HGB\", \"HCT\", \"MCV\", \"PLT\" in the last 72 hours.  No results for input(s): \"NA\", \"K\", \"CL\", \"CO2\", \"PHOS\", \"BUN\", \"CREATININE\" in the last 72 hours.    Invalid input(s): \"CA\"  Lab Results   Component Value Date    PSA 5.71 (H) 03/22/2024    PSA 1.32 (H) 09/21/2023         Urinalysis: No results for input(s): \"COLORU\", \"PHUR\", \"LABCAST\", \"WBCUA\", \"RBCUA\", \"MUCUS\", \"TRICHOMONAS\", \"YEAST\", \"BACTERIA\", \"CLARITYU\", \"SPECGRAV\", \"LEUKOCYTESUR\", \"UROBILINOGEN\", \"BILIRUBINUR\", \"BLOODU\" in the last 72 hours.    Invalid input(s): \"NITRATE\", \"GLUCOSEUKETONESUAMORPHOUS\"     -----------------------------------------------------------------      Assessment and Plan     Impression:    Patient Active Problem List   Diagnosis    Family history of early CAD

## 2024-08-02 NOTE — ANESTHESIA PRE PROCEDURE
chloride flush 0.9 % injection 5-40 mL  5-40 mL IntraVENous 2 times per day James Wheeler MD       • sodium chloride flush 0.9 % injection 5-40 mL  5-40 mL IntraVENous PRN James Wheeler MD       • 0.9 % sodium chloride infusion   IntraVENous PRN James Wheeler MD       • ceFAZolin (ANCEF) 2,000 mg in sterile water 20 mL IV syringe  2,000 mg IntraVENous On Call to OR James Wheeler MD           Allergies:  No Known Allergies    Problem List:    Patient Active Problem List   Diagnosis Code   • Family history of early CAD Z82.49   • Lipid disorder E78.9   • ASHD (arteriosclerotic heart disease) I25.10   • Presence of stent in LAD coronary artery Z95.5   • Dysphagia R13.10   • Anemia D64.9   • Hx of adenomatous colonic polyps Z86.010   • Thrombocytopenia (HCC) D69.6   • Eructation R14.2   • Splenomegaly R16.1   • Iron deficiency anemia D50.9   • Reflux esophagitis K21.00   • Lymphadenopathy, abdominal R59.0   • Acute appendicitis with localized peritonitis K35.30   • Angina pectoris, unspecified I20.9   • Atherosclerotic heart disease of native coronary artery with unspecified angina pectoris I25.119   • Mild chronic obstructive pulmonary disease (HCC) J44.9   • Respiratory crackles R09.89   • Shortness of breath R06.02   • Medtronic dual pacer  Z95.0   • Hx: UTI (urinary tract infection) Z87.440   • Hx of sepsis Z86.19   • Benign prostatic hyperplasia N40.0       Past Medical History:        Diagnosis Date   • Arthritis    • ASHD (arteriosclerotic heart disease) 01/21/2013   • Eructation 08/14/2014   • GERD (gastroesophageal reflux disease)    • Hyperlipidemia    • Hypertension    • Medtronic dual pacer  09/27/2023   • Mild chronic obstructive pulmonary disease (HCC) 06/27/2023   • Sepsis due to urinary tract infection (HCC) 02/25/2022   • Splenomegaly 09/15/2014       Past Surgical History:        Procedure Laterality Date   • APPENDECTOMY  09/22/2016    Dr. De La Cruz   • COLONOSCOPY  07/26/2016    Aimee Riddle   •

## 2024-08-02 NOTE — PROGRESS NOTES
Pt has met discharge criteria and states he is ready for discharge to home. IV removed, gauze and tape applied. Dressed in own clothes and personal belongings gathered. Discharge instructions (with opioid medication education information) given to pt and family; pt and family verbalized understanding of discharge instructions, prescriptions and follow up appointments. Pt transported to discharge lobby by [facility] staff.

## 2024-08-02 NOTE — PROGRESS NOTES
1220: Pt arrives to pacu, unresponsive post anesthesia. Pt on room air with lma in place, respirations easy and unlabored. CBI in place. Emptied 350mls from catheter bag, 1100mls remaining in bag 1 and 1200 in bag 2. VSS  1230: Pt awakens on his own, lma removed. Pt tolerated well  1240: Pt resting off and on with eyes closed, easily awakens to voice. Continues to deny pain  1245: Emptied 650 mls from catheter bag. 500 Mls remaining in bag 1 and 1200 mls remaining in catheter bag  1250: Pt meets criteria for discharge from pacu, transported back to [facility] in stable condition. VSS

## 2024-08-02 NOTE — ANESTHESIA POSTPROCEDURE EVALUATION
Department of Anesthesiology  Postprocedure Note    Patient: Vinicio Zacarias  MRN: [id number]  YOB: 1950  Date of evaluation: 8/2/2024    Procedure Summary       Date: 08/02/24 Room / Location: [facility] OR  / [facility] OR    Anesthesia Start: 1132 Anesthesia Stop: 1226    Procedure: CYSTO WITH GREENLIGHT PHOTOVAPORIZATION OF THE PROSTATE Diagnosis:       Benign prostatic hyperplasia, unspecified whether lower urinary tract symptoms present      (Benign prostatic hyperplasia, unspecified whether lower urinary tract symptoms present [N40.0])    Surgeons: James Wheeler MD Responsible Provider: Gerry Montgomery DO    Anesthesia Type: general ASA Status: 3            Anesthesia Type: No value filed.    Aaron Phase I: Aaron Score: 6    Aaron Phase II: Aaron Score: 10    Anesthesia Post Evaluation    Patient location during evaluation: PACU  Patient participation: complete - patient participated  Level of consciousness: awake  Airway patency: patent  Nausea & Vomiting: no nausea  Cardiovascular status: hemodynamically stable  Respiratory status: acceptable  Hydration status: stable  Pain management: adequate    No notable events documented.

## 2024-08-02 NOTE — PROGRESS NOTES
Pt had Greenlight with PVP today with Dr Wheeler.    Same day surgery provided education about meredith catheter prior to discharge to pt.    Pt has a 18 Armenian meredith that was to be continued at discharge.      Plan is to remove meredith catheter on MOnday in urology office 8/5/24.      Meredith catheter can be flushed with 180 ml sterile water PRN to maintain patency.     Meredith catheter can be exchanged PRN if not patent.

## 2024-08-02 NOTE — DISCHARGE INSTRUCTIONS
-OK to dc home.  -Home with meredith; you may see some blood in urine in the tubing and this is normal as long as the catheter is draining well  -If catheter does not draining, call the office or report to the ER  -Stay well hydrated  -No heavy lifting >20lbs for 6 weeks  -You may see blood with urination on and off for 4 weeks, this is normal and will improve. The most important thing is if the catheter is draining and you are able to empty your bladder.  -You may have burning with urination on and off for 2-3 weeks, this is normal and should improve  - Report to the ER if chest pain, shortness of breath, fever >101.5  - You may take tylenol or motrin OTC as needed for pain  - Take antibiotics as prescribed the day before catheter removal  - Make sure you take stool softeners so that you are not straining during bowel movements    Pt will Follow up with LULY VIZCARRA MD for a void trial. Call office for follow up.  You may resume your blood thinners in 72 hours.

## 2024-08-04 NOTE — OP NOTE
James Wheeler MD.  Urologic Surgery      [facility]    DATE: 8/2/2024  Patient:  Vinicio Zacarias  MRN: [id number]  YOB: 1950    SURGEON: James Wheeler MD.    ASSISTANT: none    PREOPERATIVE DIAGNOSIS: BPH with outlet obstruction    POSTOPERATIVE DIAGNOSIS: BPH with outlet obstruction    PROCEDURE PERFORMED:  Cystoscopy, Greenlight Photovaporization of Prostate,        ANESTHESIA: General    COMPLICATIONS: none    OR BLOOD LOSS:  Minimal    FLUIDS: Cystalloids per Anesthesia    SPECIMENS:  * No specimens in log *  none    DRAINS: [address] meredith    INDICATIONS FOR PROCEDURE:  The patient is a 74 y.o. male who presents today with Benign prostatic hyperplasia, unspecified whether lower urinary tract symptoms present [N40.0] here for CYSTO WITH GREENLIGHT PHOTOVAPORIZATION OF THE PROSTATE. After risks, benefits and alternatives of the procedure were discussed with the patient, the patient elected to proceed.     DETAILS OF PROCEDURE:  After informed consent was obtained in the preoperative area, the patient was taken back to the operating room and transferred to the operating table in supine position.  EPC cuffs were placed. The machine was turned on.  Anesthesia was induced and antibiotics were given.  The patient was placed in modified dorsal lithotomy position and sterilely prepped and draped in a standard fashion.  A timeout occurred.  Two patient identifiers were used.  The 25F rigid scope with the visual obturator was carefully advanced through the urethra.  .  Once within the bladder the visual obturator was exchanged for the resection bridge.     The ureteral orifices were located and noted to be remote from the bladder neck.    The prostate was surveyed. the lateral lobes were noted to be significantly obstructing.. There was no median lobe present. Enucleation of the median lobe was not performed. The vaporization was started proximal with a power setting of 80W. Both the

## 2024-08-05 ENCOUNTER — NURSE ONLY (OUTPATIENT)
Dept: UROLOGY | Age: 74
End: 2024-08-05

## 2024-08-05 DIAGNOSIS — N40.1 BENIGN PROSTATIC HYPERPLASIA WITH URINARY OBSTRUCTION: Primary | ICD-10-CM

## 2024-08-05 DIAGNOSIS — N13.8 BENIGN PROSTATIC HYPERPLASIA WITH URINARY OBSTRUCTION: Primary | ICD-10-CM

## 2024-08-05 NOTE — PROGRESS NOTES
Patient has given me verbal consent to perform meredith removal  Yes    DIAGNOSIS/INDICATION:  BPH with urinary obstruction    Per Mychal CARRILLO 10 cc of water deflated from meredith balloon. 22 Fr straight meredith removed without difficulty.    Foreskin reduced back down?  N/A      Pt will drink fluids and call office before 3PM or report to ER in 6-8 hours if patient unable to urinate.      F/u with provider as scheduled.

## 2024-08-05 NOTE — PROGRESS NOTES
I have personally verified, reviewed, and approved of these actions and the plan for follow-up as documented.

## 2024-08-27 ENCOUNTER — OFFICE VISIT (OUTPATIENT)
Dept: UROLOGY | Age: 74
End: 2024-08-27
Payer: MEDICARE

## 2024-08-27 VITALS — HEIGHT: 69 IN | BODY MASS INDEX: 30.21 KG/M2 | RESPIRATION RATE: 18 BRPM | WEIGHT: 204 LBS

## 2024-08-27 DIAGNOSIS — N40.1 BENIGN PROSTATIC HYPERPLASIA WITH URINARY OBSTRUCTION: Primary | ICD-10-CM

## 2024-08-27 DIAGNOSIS — N13.8 BENIGN PROSTATIC HYPERPLASIA WITH URINARY OBSTRUCTION: Primary | ICD-10-CM

## 2024-08-27 LAB
BACTERIA: ABNORMAL
BILIRUB UR QL STRIP: NEGATIVE
BILIRUBIN, URINE: NEGATIVE
BLOOD URINE, POC: ABNORMAL
CASTS #/AREA URNS LPF: ABNORMAL /LPF
CASTS #/AREA URNS LPF: ABNORMAL /LPF
CHARACTER UR: CLEAR
CHARACTER, URINE: CLEAR
CHARCOAL URNS QL MICRO: ABNORMAL
COLOR UR: YELLOW
COLOR, UA: YELLOW
CRYSTALS URNS QL MICRO: ABNORMAL
EPITHELIAL CELLS, UA: ABNORMAL /HPF
GLUCOSE UR QL STRIP.AUTO: NEGATIVE MG/DL
GLUCOSE URINE: NEGATIVE MG/DL
HGB UR QL STRIP.AUTO: ABNORMAL
KETONES UR QL STRIP.AUTO: NEGATIVE
KETONES, URINE: NEGATIVE
LEUKOCYTE CLUMPS, URINE: ABNORMAL
LEUKOCYTE ESTERASE UR QL STRIP.AUTO: ABNORMAL
NITRITE UR QL STRIP.AUTO: NEGATIVE
NITRITE, URINE: NEGATIVE
PH UR STRIP.AUTO: 5.5 [PH] (ref 5–9)
PH, URINE: 5.5 (ref 5–9)
POST VOID RESIDUAL (PVR): 102 ML
PROT UR STRIP.AUTO-MCNC: NEGATIVE MG/DL
PROTEIN, URINE: NEGATIVE MG/DL
RBC #/AREA URNS HPF: ABNORMAL /HPF
RENAL EPI CELLS #/AREA URNS HPF: ABNORMAL /[HPF]
SPECIFIC GRAVITY UA: 1.01 (ref 1–1.03)
SPECIFIC GRAVITY UA: 1.01 (ref 1–1.03)
UROBILINOGEN, URINE: 0.2 EU/DL (ref 0–1)
UROBILINOGEN, URINE: 0.2 EU/DL (ref 0–1)
WBC #/AREA URNS HPF: ABNORMAL /HPF
YEAST LIKE FUNGI URNS QL MICRO: ABNORMAL

## 2024-08-27 PROCEDURE — 99214 OFFICE O/P EST MOD 30 MIN: CPT

## 2024-08-27 PROCEDURE — 1123F ACP DISCUSS/DSCN MKR DOCD: CPT

## 2024-08-27 PROCEDURE — 81003 URINALYSIS AUTO W/O SCOPE: CPT

## 2024-08-27 PROCEDURE — 51798 US URINE CAPACITY MEASURE: CPT

## 2024-08-27 PROCEDURE — G8427 DOCREV CUR MEDS BY ELIG CLIN: HCPCS

## 2024-08-27 PROCEDURE — 1036F TOBACCO NON-USER: CPT

## 2024-08-27 PROCEDURE — G8417 CALC BMI ABV UP PARAM F/U: HCPCS

## 2024-08-27 RX ORDER — DOXYCYCLINE HYCLATE 100 MG
100 TABLET ORAL 2 TIMES DAILY
Qty: 20 TABLET | Refills: 0 | Status: SHIPPED | OUTPATIENT
Start: 2024-08-27 | End: 2024-09-06

## 2024-08-27 NOTE — PROGRESS NOTES
distress.   Musculoskeletal:         General: No deformity or signs of injury.      Cervical back: Normal range of motion and neck supple.   Skin:     General: Skin is warm and dry.   Neurological:      General: No focal deficit present.      Mental Status: He is alert.   Psychiatric:         Mood and Affect: Mood normal.         Behavior: Behavior normal.         Thought Content: Thought content normal.         Judgment: Judgment normal.         POC  Results for POC orders placed in visit on 08/27/24   POCT Urinalysis No Micro (Auto)   Result Value Ref Range    Glucose, Ur Negative NEGATIVE mg/dl    Bilirubin, Urine Negative     Ketones, Urine Negative NEGATIVE    Specific Gravity, UA 1.015 1.002 - 1.030    Blood, UA POC Large (A) NEGATIVE    pH, Urine 5.50 5.0 - 9.0    Protein, Urine Negative NEGATIVE mg/dl    Urobilinogen, Urine 0.20 0.0 - 1.0 eu/dl    Nitrite, Urine Negative NEGATIVE    Leukocyte Clumps, Urine Trace (A) NEGATIVE    Color, UA Yellow YELLOW-STRAW    Character, Urine Clear CLR-SL.CLOUD   poct post void residual   Result Value Ref Range    post void residual 102 ml         Patients recent PSA values are as follows  Lab Results   Component Value Date    PSA 5.71 (H) 03/22/2024    PSA 1.32 (H) 09/21/2023        Recent BUN/Creatinine:  Lab Results   Component Value Date/Time    BUN 26 07/02/2024 07:05 AM    CREATININE 1.3 07/02/2024 07:05 AM       Radiology  CT a/p at an OSH in 3/2024    CT ABDOMEN:      Multiple images were submitted.      Mild atelectasis vs. scarring at the lung bases bilateral.      Noncontrast liver and spleen are unremarkable.      Gallbladder is present.      Gallbladder is mildly distended.      Pancreas is within normal limits.      Kidneys showed no evidence for obstructive uropathy.      No radiodense calculi were seen either within the right or the left ureter.      Small stable appearing retroperitoneal lymph nodes.      Nondistended loops of bowel.      No free fluid.

## 2024-08-27 NOTE — PATIENT INSTRUCTIONS
I will send your urine for additional studies and call with abnormal results.   Follow-up with Dr. Wheeler in 3 months or sooner if needed  Call with questions, comments, or concerns. I recommend going to the ED for further evaluation if you develop fever, chills, nausea, vomiting, chest pain, SOB, or calf pain.

## 2024-08-28 ENCOUNTER — TELEPHONE (OUTPATIENT)
Dept: UROLOGY | Age: 74
End: 2024-08-28

## 2024-08-28 DIAGNOSIS — R31.29 MICROHEMATURIA: Primary | ICD-10-CM

## 2024-08-28 NOTE — TELEPHONE ENCOUNTER
Reviewed patient's urine. He does have microhematuria. No evidence of infection    Please have him finish doxycyline and repeat a urine micro at completion    The patient should go to the ED should they develop fever, chills, nausea, vomiting, chest pain, SOB, calf pain, feelings of incomplete emptying, or should they otherwise feel they need evaluated    F/u as scheduled or sooner if needed

## 2024-08-29 LAB — BACTERIA UR CULT: NORMAL

## 2024-09-18 ENCOUNTER — LAB (OUTPATIENT)
Dept: LAB | Age: 74
End: 2024-09-18

## 2024-09-18 DIAGNOSIS — R31.29 MICROHEMATURIA: ICD-10-CM

## 2024-09-18 LAB
BACTERIA: ABNORMAL
BILIRUB UR QL STRIP: NEGATIVE
CASTS #/AREA URNS LPF: ABNORMAL /LPF
CASTS #/AREA URNS LPF: ABNORMAL /LPF
CHARACTER UR: ABNORMAL
CHARCOAL URNS QL MICRO: ABNORMAL
COLOR UR: YELLOW
CRYSTALS URNS QL MICRO: ABNORMAL
EPITHELIAL CELLS, UA: ABNORMAL /HPF
GLUCOSE UR QL STRIP.AUTO: NEGATIVE MG/DL
HGB UR QL STRIP.AUTO: ABNORMAL
KETONES UR QL STRIP.AUTO: NEGATIVE
LEUKOCYTE ESTERASE UR QL STRIP.AUTO: ABNORMAL
MUCOUS THREADS URNS QL MICRO: ABNORMAL
NITRITE UR QL STRIP.AUTO: NEGATIVE
PH UR STRIP.AUTO: 5.5 [PH] (ref 5–9)
PROT UR STRIP.AUTO-MCNC: 30 MG/DL
RBC #/AREA URNS HPF: ABNORMAL /HPF
RENAL EPI CELLS #/AREA URNS HPF: ABNORMAL /[HPF]
SPECIFIC GRAVITY UA: 1.02 (ref 1–1.03)
UROBILINOGEN, URINE: 0.2 EU/DL (ref 0–1)
WBC #/AREA URNS HPF: ABNORMAL /HPF
YEAST LIKE FUNGI URNS QL MICRO: ABNORMAL

## 2024-09-19 ENCOUNTER — TELEPHONE (OUTPATIENT)
Dept: CARDIOLOGY CLINIC | Age: 74
End: 2024-09-19

## 2024-09-20 ENCOUNTER — TELEPHONE (OUTPATIENT)
Dept: UROLOGY | Age: 74
End: 2024-09-20

## 2024-09-20 DIAGNOSIS — R31.29 MICROHEMATURIA: Primary | ICD-10-CM

## 2024-09-23 ENCOUNTER — OFFICE VISIT (OUTPATIENT)
Dept: CARDIOLOGY CLINIC | Age: 74
End: 2024-09-23
Payer: MEDICARE

## 2024-09-23 VITALS
HEIGHT: 69 IN | WEIGHT: 205 LBS | HEART RATE: 82 BPM | BODY MASS INDEX: 30.36 KG/M2 | SYSTOLIC BLOOD PRESSURE: 138 MMHG | DIASTOLIC BLOOD PRESSURE: 80 MMHG

## 2024-09-23 DIAGNOSIS — I10 PRIMARY HYPERTENSION: Primary | ICD-10-CM

## 2024-09-23 DIAGNOSIS — I25.10 CORONARY ARTERY DISEASE INVOLVING NATIVE CORONARY ARTERY OF NATIVE HEART WITHOUT ANGINA PECTORIS: ICD-10-CM

## 2024-09-23 DIAGNOSIS — I49.3 PVC (PREMATURE VENTRICULAR CONTRACTION): ICD-10-CM

## 2024-09-23 PROCEDURE — 1036F TOBACCO NON-USER: CPT | Performed by: NUCLEAR MEDICINE

## 2024-09-23 PROCEDURE — G8427 DOCREV CUR MEDS BY ELIG CLIN: HCPCS | Performed by: NUCLEAR MEDICINE

## 2024-09-23 PROCEDURE — G8417 CALC BMI ABV UP PARAM F/U: HCPCS | Performed by: NUCLEAR MEDICINE

## 2024-09-23 PROCEDURE — 99213 OFFICE O/P EST LOW 20 MIN: CPT | Performed by: NUCLEAR MEDICINE

## 2024-09-23 PROCEDURE — 1123F ACP DISCUSS/DSCN MKR DOCD: CPT | Performed by: NUCLEAR MEDICINE

## 2024-09-23 PROCEDURE — 3079F DIAST BP 80-89 MM HG: CPT | Performed by: NUCLEAR MEDICINE

## 2024-09-23 PROCEDURE — 3075F SYST BP GE 130 - 139MM HG: CPT | Performed by: NUCLEAR MEDICINE

## 2024-09-23 RX ORDER — SIMVASTATIN 40 MG
40 TABLET ORAL NIGHTLY
Qty: 90 TABLET | Refills: 3 | Status: SHIPPED | OUTPATIENT
Start: 2024-09-23

## 2024-09-24 ENCOUNTER — OFFICE VISIT (OUTPATIENT)
Dept: PULMONOLOGY | Age: 74
End: 2024-09-24
Payer: MEDICARE

## 2024-09-24 VITALS
BODY MASS INDEX: 30.78 KG/M2 | OXYGEN SATURATION: 96 % | HEIGHT: 69 IN | WEIGHT: 207.8 LBS | DIASTOLIC BLOOD PRESSURE: 62 MMHG | TEMPERATURE: 97.5 F | SYSTOLIC BLOOD PRESSURE: 122 MMHG | HEART RATE: 80 BPM

## 2024-09-24 DIAGNOSIS — E66.9 OBESITY (BMI 30-39.9): ICD-10-CM

## 2024-09-24 DIAGNOSIS — R35.1 NOCTURIA: ICD-10-CM

## 2024-09-24 DIAGNOSIS — G47.33 OSA (OBSTRUCTIVE SLEEP APNEA): Primary | ICD-10-CM

## 2024-09-24 DIAGNOSIS — F45.8 AEROPHAGIA: ICD-10-CM

## 2024-09-24 PROCEDURE — G8417 CALC BMI ABV UP PARAM F/U: HCPCS | Performed by: INTERNAL MEDICINE

## 2024-09-24 PROCEDURE — 99214 OFFICE O/P EST MOD 30 MIN: CPT | Performed by: INTERNAL MEDICINE

## 2024-09-24 PROCEDURE — G8427 DOCREV CUR MEDS BY ELIG CLIN: HCPCS | Performed by: INTERNAL MEDICINE

## 2024-09-24 PROCEDURE — 1036F TOBACCO NON-USER: CPT | Performed by: INTERNAL MEDICINE

## 2024-09-24 PROCEDURE — 1123F ACP DISCUSS/DSCN MKR DOCD: CPT | Performed by: INTERNAL MEDICINE

## 2024-09-30 ENCOUNTER — HOSPITAL ENCOUNTER (OUTPATIENT)
Dept: ULTRASOUND IMAGING | Age: 74
Discharge: HOME | End: 2024-09-30
Attending: FAMILY MEDICINE
Payer: MEDICARE

## 2024-09-30 DIAGNOSIS — R80.9 PROTEINURIA, UNSPECIFIED TYPE: ICD-10-CM

## 2024-09-30 PROCEDURE — 76770 US EXAM ABDO BACK WALL COMP: CPT

## 2024-10-09 ENCOUNTER — TELEPHONE (OUTPATIENT)
Dept: UROLOGY | Age: 74
End: 2024-10-09

## 2024-10-09 ENCOUNTER — LAB (OUTPATIENT)
Dept: LAB | Age: 74
End: 2024-10-09

## 2024-10-09 DIAGNOSIS — N28.9 RENAL INSUFFICIENCY: ICD-10-CM

## 2024-10-09 DIAGNOSIS — R31.29 MICROHEMATURIA: ICD-10-CM

## 2024-10-09 DIAGNOSIS — R31.29 MICROHEMATURIA: Primary | ICD-10-CM

## 2024-10-09 DIAGNOSIS — Z12.5 PROSTATE CANCER SCREENING: ICD-10-CM

## 2024-10-09 LAB
BACTERIA: ABNORMAL
BILIRUB UR QL STRIP: NEGATIVE
CASTS #/AREA URNS LPF: ABNORMAL /LPF
CASTS #/AREA URNS LPF: ABNORMAL /LPF
CHARACTER UR: CLEAR
CHARCOAL URNS QL MICRO: ABNORMAL
COLOR UR: YELLOW
CRYSTALS URNS QL MICRO: ABNORMAL
EPITHELIAL CELLS, UA: ABNORMAL /HPF
GLUCOSE UR QL STRIP.AUTO: NEGATIVE MG/DL
HGB UR QL STRIP.AUTO: ABNORMAL
KETONES UR QL STRIP.AUTO: NEGATIVE
LEUKOCYTE ESTERASE UR QL STRIP.AUTO: ABNORMAL
NITRITE UR QL STRIP.AUTO: NEGATIVE
PH UR STRIP.AUTO: 6.5 [PH] (ref 5–9)
PROT UR STRIP.AUTO-MCNC: ABNORMAL MG/DL
RBC #/AREA URNS HPF: ABNORMAL /HPF
RENAL EPI CELLS #/AREA URNS HPF: ABNORMAL /[HPF]
SPECIFIC GRAVITY UA: 1.01 (ref 1–1.03)
UROBILINOGEN, URINE: 0.2 EU/DL (ref 0–1)
WBC #/AREA URNS HPF: ABNORMAL /HPF
YEAST LIKE FUNGI URNS QL MICRO: ABNORMAL

## 2024-10-09 NOTE — TELEPHONE ENCOUNTER
Patient repeated micro a bit early. Still with microhematuria.   Can repeat in 3 weeks (which would be 6 weeks as previously discussed) or schedule an OV/VV to discuss    The patient should go to the ED should they develop fever, chills, nausea, vomiting, chest pain, SOB, calf pain, feelings of incomplete emptying, or should they otherwise feel they need evaluated

## 2024-10-09 NOTE — TELEPHONE ENCOUNTER
Patient advised of the urine results. He will repeat the urine in 3 weeks.     Is it ok to resume bike riding and being on the ? He denies seeing any visible blood.

## 2024-10-09 NOTE — TELEPHONE ENCOUNTER
Patient advised ok to resume activity and if he noticed blood in the urine or symptoms of infection to call the office. He voiced understanding.

## 2024-10-09 NOTE — TELEPHONE ENCOUNTER
He is more than 6 weeks out post-operatively. He can ease into those activities    Contact the office with concerns of a UTI    The patient should go to the ED should they develop fever, chills, nausea, vomiting, chest pain, SOB, calf pain, feelings of incomplete emptying, or should they otherwise feel they need evaluated

## 2024-10-17 PROCEDURE — 93296 REM INTERROG EVL PM/IDS: CPT | Performed by: NUCLEAR MEDICINE

## 2024-10-17 PROCEDURE — 93294 REM INTERROG EVL PM/LDLS PM: CPT | Performed by: NUCLEAR MEDICINE

## 2024-10-30 ENCOUNTER — LAB (OUTPATIENT)
Dept: LAB | Age: 74
End: 2024-10-30

## 2024-10-30 DIAGNOSIS — R31.29 MICROHEMATURIA: ICD-10-CM

## 2024-10-30 LAB
BACTERIA: ABNORMAL
BILIRUB UR QL STRIP: NEGATIVE
CASTS #/AREA URNS LPF: ABNORMAL /LPF
CASTS #/AREA URNS LPF: ABNORMAL /LPF
CHARACTER UR: CLEAR
CHARCOAL URNS QL MICRO: ABNORMAL
COLOR UR: YELLOW
CRYSTALS URNS QL MICRO: ABNORMAL
EPITHELIAL CELLS, UA: ABNORMAL /HPF
GLUCOSE UR QL STRIP.AUTO: NEGATIVE MG/DL
HGB UR QL STRIP.AUTO: ABNORMAL
KETONES UR QL STRIP.AUTO: NEGATIVE
LEUKOCYTE ESTERASE UR QL STRIP.AUTO: ABNORMAL
NITRITE UR QL STRIP.AUTO: NEGATIVE
PH UR STRIP.AUTO: 7 [PH] (ref 5–9)
PROT UR STRIP.AUTO-MCNC: ABNORMAL MG/DL
RBC #/AREA URNS HPF: ABNORMAL /HPF
RENAL EPI CELLS #/AREA URNS HPF: ABNORMAL /[HPF]
SPECIFIC GRAVITY UA: 1.01 (ref 1–1.03)
UROBILINOGEN, URINE: 0.2 EU/DL (ref 0–1)
WBC #/AREA URNS HPF: ABNORMAL /HPF
YEAST LIKE FUNGI URNS QL MICRO: ABNORMAL

## 2024-10-31 ENCOUNTER — TELEPHONE (OUTPATIENT)
Dept: UROLOGY | Age: 74
End: 2024-10-31

## 2024-11-14 ENCOUNTER — OFFICE VISIT (OUTPATIENT)
Dept: UROLOGY | Age: 74
End: 2024-11-14
Payer: MEDICARE

## 2024-11-14 VITALS — BODY MASS INDEX: 30.66 KG/M2 | RESPIRATION RATE: 18 BRPM | HEIGHT: 69 IN | WEIGHT: 207 LBS

## 2024-11-14 DIAGNOSIS — N40.1 BENIGN PROSTATIC HYPERPLASIA WITH URINARY OBSTRUCTION: ICD-10-CM

## 2024-11-14 DIAGNOSIS — N13.8 BENIGN PROSTATIC HYPERPLASIA WITH URINARY OBSTRUCTION: ICD-10-CM

## 2024-11-14 DIAGNOSIS — R97.20 ELEVATED PSA: ICD-10-CM

## 2024-11-14 DIAGNOSIS — R31.29 MICROHEMATURIA: Primary | ICD-10-CM

## 2024-11-14 LAB
BILIRUBIN, URINE: NEGATIVE
BLOOD URINE, POC: ABNORMAL
CHARACTER, URINE: CLEAR
COLOR, UA: YELLOW
GLUCOSE URINE: NEGATIVE MG/DL
KETONES, URINE: ABNORMAL
LEUKOCYTE CLUMPS, URINE: ABNORMAL
NITRITE, URINE: NEGATIVE
PH, URINE: 7 (ref 5–9)
PROTEIN, URINE: NEGATIVE MG/DL
SPECIFIC GRAVITY UA: 1.01 (ref 1–1.03)
UROBILINOGEN, URINE: 0.2 EU/DL (ref 0–1)

## 2024-11-14 PROCEDURE — G8482 FLU IMMUNIZE ORDER/ADMIN: HCPCS | Performed by: UROLOGY

## 2024-11-14 PROCEDURE — G8427 DOCREV CUR MEDS BY ELIG CLIN: HCPCS | Performed by: UROLOGY

## 2024-11-14 PROCEDURE — 81003 URINALYSIS AUTO W/O SCOPE: CPT | Performed by: UROLOGY

## 2024-11-14 PROCEDURE — 99214 OFFICE O/P EST MOD 30 MIN: CPT | Performed by: UROLOGY

## 2024-11-14 PROCEDURE — G8417 CALC BMI ABV UP PARAM F/U: HCPCS | Performed by: UROLOGY

## 2024-11-14 PROCEDURE — 1159F MED LIST DOCD IN RCRD: CPT | Performed by: UROLOGY

## 2024-11-14 PROCEDURE — 1036F TOBACCO NON-USER: CPT | Performed by: UROLOGY

## 2024-11-14 PROCEDURE — 1123F ACP DISCUSS/DSCN MKR DOCD: CPT | Performed by: UROLOGY

## 2024-11-14 NOTE — PROGRESS NOTES
James Wheeler MD.    [facility] [place] [facility]  [facility] UROLOGY  [address]  [address]  [facility] [address]  Dept: [phone]  Dept Fax: [phone]  Loc: [phone]    [facility] Urology Office Note -     Patient:  Vinicio Zacarias  YOB: 1950    The patient is a 74 y.o. male who presents today for evaluation of the following problems:   Chief Complaint   Patient presents with    Follow-up    Benign Prostatic Hypertrophy    Hematuria     Micro     referred/consultation requested by Luc Hearn MD.    History of Present Illness:    BPH  Spends many months down in [place]  Hx of sepsis ecoli.   Mild LUTS  Hx of hydronephrosis in past -- saw [facility]  Had pvp- voiding symptoms are stable. No UTI's since surgery    microhematuria  Resolved  Nonsmoker  Cysto was negative    Elevated PSA  In setting of infection        Requested/reviewed records from Luc Hearn MD office and/or outside physician/EMR    (Patient's old records have been requested, reviewed and pertinent findings summarized in today's note.)    Procedures Today: N/A      Last several PSA's:  Lab Results   Component Value Date    PSA 5.71 (H) 03/22/2024    PSA 1.32 (H) 09/21/2023       Last total testosterone:  No results found for: \"TESTOSTERONE\"    Urinalysis today:  Results for orders placed or performed in visit on 11/14/24   POCT Urinalysis No Micro (Auto)   Result Value Ref Range    Glucose, Ur Negative NEGATIVE mg/dl    Bilirubin, Urine Negative     Ketones, Urine Trace (A) NEGATIVE    Specific Gravity, UA 1.015 1.002 - 1.030    Blood, UA POC Trace-intact NEGATIVE    pH, Urine 7.00 5.0 - 9.0    Protein, Urine Negative NEGATIVE mg/dl    Urobilinogen, Urine 0.20 0.0 - 1.0 eu/dl    Nitrite, Urine Negative NEGATIVE    Leukocyte Clumps, Urine Small (A) NEGATIVE    Color, UA Yellow YELLOW-STRAW    Character, Urine Clear CLR-SL.CLOUD         Last BUN and creatinine:  Lab Results   Component Value

## 2024-12-05 ENCOUNTER — LAB (OUTPATIENT)
Dept: LAB | Age: 74
End: 2024-12-05

## 2024-12-05 DIAGNOSIS — Z12.5 PROSTATE CANCER SCREENING: ICD-10-CM

## 2024-12-05 DIAGNOSIS — N28.9 RENAL INSUFFICIENCY: ICD-10-CM

## 2024-12-05 DIAGNOSIS — Z51.81 MEDICATION MONITORING ENCOUNTER: ICD-10-CM

## 2024-12-05 DIAGNOSIS — D50.9 IRON DEFICIENCY ANEMIA, UNSPECIFIED IRON DEFICIENCY ANEMIA TYPE: ICD-10-CM

## 2024-12-05 DIAGNOSIS — Z95.5 PRESENCE OF STENT IN LAD CORONARY ARTERY: ICD-10-CM

## 2024-12-05 LAB
ALBUMIN SERPL BCG-MCNC: 4.2 G/DL (ref 3.5–5.1)
ALP SERPL-CCNC: 62 U/L (ref 38–126)
ALT SERPL W/O P-5'-P-CCNC: 15 U/L (ref 11–66)
ANION GAP SERPL CALC-SCNC: 12 MEQ/L (ref 8–16)
AST SERPL-CCNC: 23 U/L (ref 5–40)
BASOPHILS ABSOLUTE: 0 THOU/MM3 (ref 0–0.1)
BASOPHILS NFR BLD AUTO: 0.7 %
BILIRUB CONJ SERPL-MCNC: 0.2 MG/DL (ref 0.1–13.8)
BILIRUB SERPL-MCNC: 0.6 MG/DL (ref 0.3–1.2)
BUN SERPL-MCNC: 20 MG/DL (ref 7–22)
CALCIUM SERPL-MCNC: 9.3 MG/DL (ref 8.5–10.5)
CHLORIDE SERPL-SCNC: 101 MEQ/L (ref 98–111)
CHOLEST SERPL-MCNC: 164 MG/DL (ref 100–199)
CO2 SERPL-SCNC: 30 MEQ/L (ref 23–33)
CREAT SERPL-MCNC: 1.2 MG/DL (ref 0.4–1.2)
DEPRECATED RDW RBC AUTO: 42.3 FL (ref 35–45)
EOSINOPHIL NFR BLD AUTO: 2.7 %
EOSINOPHILS ABSOLUTE: 0.1 THOU/MM3 (ref 0–0.4)
ERYTHROCYTE [DISTWIDTH] IN BLOOD BY AUTOMATED COUNT: 13.4 % (ref 11.5–14.5)
GFR SERPL CREATININE-BSD FRML MDRD: 62 ML/MIN/1.73M2
GLUCOSE SERPL-MCNC: 96 MG/DL (ref 70–108)
HCT VFR BLD AUTO: 44.4 % (ref 42–52)
HDLC SERPL-MCNC: 51 MG/DL
HGB BLD-MCNC: 14.3 GM/DL (ref 14–18)
IMM GRANULOCYTES # BLD AUTO: 0.01 THOU/MM3 (ref 0–0.07)
IMM GRANULOCYTES NFR BLD AUTO: 0.2 %
IRON SERPL-MCNC: 86 UG/DL (ref 65–195)
LDLC SERPL CALC-MCNC: 100 MG/DL
LYMPHOCYTES ABSOLUTE: 2.2 THOU/MM3 (ref 1–4.8)
LYMPHOCYTES NFR BLD AUTO: 40.5 %
MCH RBC QN AUTO: 28.1 PG (ref 26–33)
MCHC RBC AUTO-ENTMCNC: 32.2 GM/DL (ref 32.2–35.5)
MCV RBC AUTO: 87.4 FL (ref 80–94)
MONOCYTES ABSOLUTE: 0.3 THOU/MM3 (ref 0.4–1.3)
MONOCYTES NFR BLD AUTO: 5.4 %
NEUTROPHILS ABSOLUTE: 2.8 THOU/MM3 (ref 1.8–7.7)
NEUTROPHILS NFR BLD AUTO: 50.5 %
NRBC BLD AUTO-RTO: 0 /100 WBC
PLATELET # BLD AUTO: 127 THOU/MM3 (ref 130–400)
PMV BLD AUTO: 10.2 FL (ref 9.4–12.4)
POTASSIUM SERPL-SCNC: 3.9 MEQ/L (ref 3.5–5.2)
PROT SERPL-MCNC: 6.9 G/DL (ref 6.1–8)
PSA SERPL-MCNC: 1.59 NG/ML (ref 0–1)
RBC # BLD AUTO: 5.08 MILL/MM3 (ref 4.7–6.1)
SODIUM SERPL-SCNC: 143 MEQ/L (ref 135–145)
TRIGL SERPL-MCNC: 64 MG/DL (ref 0–199)
WBC # BLD AUTO: 5.5 THOU/MM3 (ref 4.8–10.8)

## 2025-01-14 ENCOUNTER — TELEPHONE (OUTPATIENT)
Dept: CARDIOLOGY CLINIC | Age: 75
End: 2025-01-14

## 2025-01-15 RX ORDER — METOPROLOL SUCCINATE 25 MG/1
25 TABLET, EXTENDED RELEASE ORAL DAILY
COMMUNITY

## 2025-01-15 RX ORDER — METOPROLOL TARTRATE 25 MG/1
25 TABLET, FILM COATED ORAL 2 TIMES DAILY
COMMUNITY

## 2025-01-16 PROCEDURE — 93294 REM INTERROG EVL PM/LDLS PM: CPT | Performed by: NUCLEAR MEDICINE

## 2025-01-16 PROCEDURE — 93296 REM INTERROG EVL PM/IDS: CPT | Performed by: NUCLEAR MEDICINE

## 2025-02-18 RX ORDER — METOPROLOL TARTRATE 25 MG/1
25 TABLET, FILM COATED ORAL 2 TIMES DAILY
Qty: 180 TABLET | Refills: 3 | Status: SHIPPED | OUTPATIENT
Start: 2025-02-18

## 2025-04-15 ENCOUNTER — TELEPHONE (OUTPATIENT)
Dept: CARDIOLOGY CLINIC | Age: 75
End: 2025-04-15

## 2025-04-16 NOTE — TELEPHONE ENCOUNTER
Call to patient and wife.   Reports they are aware of this from the beginning of March  He is asymptomatic and wants to wait until his appt in May  Will call us if anything changes.

## 2025-05-02 ENCOUNTER — OFFICE VISIT (OUTPATIENT)
Dept: CARDIOLOGY CLINIC | Age: 75
End: 2025-05-02

## 2025-05-02 ENCOUNTER — CLINICAL SUPPORT (OUTPATIENT)
Dept: CARDIOLOGY CLINIC | Age: 75
End: 2025-05-02

## 2025-05-02 VITALS
HEART RATE: 68 BPM | HEIGHT: 69 IN | SYSTOLIC BLOOD PRESSURE: 136 MMHG | DIASTOLIC BLOOD PRESSURE: 79 MMHG | BODY MASS INDEX: 30.01 KG/M2 | WEIGHT: 202.6 LBS

## 2025-05-02 DIAGNOSIS — R06.02 SHORTNESS OF BREATH: ICD-10-CM

## 2025-05-02 DIAGNOSIS — Z95.0 PACEMAKER: ICD-10-CM

## 2025-05-02 DIAGNOSIS — Z95.0 PACEMAKER: Primary | ICD-10-CM

## 2025-05-02 DIAGNOSIS — E78.01 FAMILIAL HYPERCHOLESTEROLEMIA: ICD-10-CM

## 2025-05-02 DIAGNOSIS — I49.3 PVC (PREMATURE VENTRICULAR CONTRACTION): Primary | ICD-10-CM

## 2025-05-02 NOTE — PROGRESS NOTES
Patient isnt sure if he's taking Metoprolol succinate or tartrate.  
modification and medical management  Thank you for allowing me to participate in the care of your patient. Please don't hesitate to contact me regarding any further issues related to the patient care    Orders Placed:  Orders Placed This Encounter   Procedures    EKG 12 Lead     Reason for Exam?:   Other       Prescribed:  No orders of the defined types were placed in this encounter.         Discussed use, benefit, and side effects of prescribed medications. All patient questions answered. Pt voicedunderstanding. Instructed to continue current medications, diet and exercise. Continue risk factor modification and medical management. Patient agreed with treatment plan. Follow up as directed.    Electronically signedby Rolo Cortez MD on 5/2/2025 at 9:17 AM

## 2025-05-10 ENCOUNTER — HOSPITAL ENCOUNTER (EMERGENCY)
Age: 75
Discharge: HOME | End: 2025-05-10
Payer: MEDICARE

## 2025-05-10 VITALS
OXYGEN SATURATION: 95 % | TEMPERATURE: 97.8 F | HEART RATE: 65 BPM | SYSTOLIC BLOOD PRESSURE: 132 MMHG | RESPIRATION RATE: 16 BRPM | DIASTOLIC BLOOD PRESSURE: 82 MMHG

## 2025-05-10 DIAGNOSIS — J22 ACUTE LOWER RESPIRATORY TRACT INFECTION: Primary | ICD-10-CM

## 2025-05-10 PROCEDURE — 99213 OFFICE O/P EST LOW 20 MIN: CPT

## 2025-05-10 PROCEDURE — 99213 OFFICE O/P EST LOW 20 MIN: CPT | Performed by: NURSE PRACTITIONER

## 2025-05-10 RX ORDER — AZITHROMYCIN 250 MG/1
TABLET, FILM COATED ORAL
Qty: 6 TABLET | Refills: 0 | Status: SHIPPED | OUTPATIENT
Start: 2025-05-10 | End: 2025-05-20

## 2025-05-10 RX ORDER — BENZONATATE 200 MG/1
200 CAPSULE ORAL 3 TIMES DAILY PRN
Qty: 21 CAPSULE | Refills: 0 | Status: SHIPPED | OUTPATIENT
Start: 2025-05-10 | End: 2025-05-17

## 2025-05-10 ASSESSMENT — ENCOUNTER SYMPTOMS
APNEA: 0
NAUSEA: 0
RHINORRHEA: 0
EYE ITCHING: 0
SORE THROAT: 0
SHORTNESS OF BREATH: 1
DIARRHEA: 0
WHEEZING: 0
EYE DISCHARGE: 0
COUGH: 1
SINUS PRESSURE: 0
VOMITING: 0

## 2025-05-10 NOTE — ED PROVIDER NOTES
[facility] URGENT CARE  Urgent Care Encounter       CHIEF COMPLAINT       Chief Complaint   Patient presents with    Cough      Started in  head now settled in chest for  5 days.       Nurses Notes reviewed and I agree except as noted in the HPI.  HISTORY OF PRESENT ILLNESS   Vinicio Zacarias is a 75 y.o. male who presents to the urgent care for complaints of cough, congestion, shortness of breath.  He states that his symptoms been ongoing now for 5 days.  He states that his symptoms been worsening over the last 24 hours.  He has been using supportive measures at home with a hot shower.  This is provided some relief.  He states that on onset it was his head he had any bilateral ear pain and sinus pressure.  This has since resolved and settled down into his chest    HPI    REVIEW OF SYSTEMS     Review of Systems   Constitutional:  Negative for activity change, appetite change, chills and fever.   HENT:  Negative for congestion, rhinorrhea, sinus pressure and sore throat.    Eyes:  Negative for discharge and itching.   Respiratory:  Positive for cough and shortness of breath. Negative for apnea and wheezing.    Cardiovascular:  Negative for chest pain.   Gastrointestinal:  Negative for diarrhea, nausea and vomiting.   Genitourinary:  Negative for decreased urine volume and difficulty urinating.   Skin:  Negative for rash.   Allergic/Immunologic: Negative for environmental allergies and food allergies.   Neurological:  Negative for headaches.       PAST MEDICAL HISTORY         Diagnosis Date    Arthritis     ASHD (arteriosclerotic heart disease) 01/21/2013    Eructation 08/14/2014    GERD (gastroesophageal reflux disease)     Hyperlipidemia     Hypertension     Medtronic dual pacer  09/27/2023    Mild chronic obstructive pulmonary disease (HCC) 06/27/2023    Sepsis due to urinary tract infection (HCC) 02/25/2022    Splenomegaly 09/15/2014       SURGICALHISTORY     Patient  has a past surgical history that

## 2025-05-27 ENCOUNTER — HOSPITAL ENCOUNTER (OUTPATIENT)
Dept: PULMONOLOGY | Age: 75
Discharge: HOME | End: 2025-05-27
Attending: INTERNAL MEDICINE
Payer: MEDICARE

## 2025-05-27 DIAGNOSIS — J44.9 STAGE 1 MILD COPD BY GOLD CLASSIFICATION (HCC): ICD-10-CM

## 2025-05-27 PROCEDURE — 94060 EVALUATION OF WHEEZING: CPT

## 2025-06-10 ENCOUNTER — OFFICE VISIT (OUTPATIENT)
Dept: PULMONOLOGY | Age: 75
End: 2025-06-10
Payer: MEDICARE

## 2025-06-10 VITALS
TEMPERATURE: 97.4 F | OXYGEN SATURATION: 94 % | DIASTOLIC BLOOD PRESSURE: 68 MMHG | SYSTOLIC BLOOD PRESSURE: 110 MMHG | BODY MASS INDEX: 30.1 KG/M2 | WEIGHT: 203.2 LBS | HEART RATE: 79 BPM | HEIGHT: 69 IN

## 2025-06-10 DIAGNOSIS — J44.9 STAGE 1 MILD COPD BY GOLD CLASSIFICATION (HCC): Primary | ICD-10-CM

## 2025-06-10 PROCEDURE — G8427 DOCREV CUR MEDS BY ELIG CLIN: HCPCS

## 2025-06-10 PROCEDURE — 99213 OFFICE O/P EST LOW 20 MIN: CPT

## 2025-06-10 PROCEDURE — 3023F SPIROM DOC REV: CPT

## 2025-06-10 PROCEDURE — G8417 CALC BMI ABV UP PARAM F/U: HCPCS

## 2025-06-10 PROCEDURE — 1036F TOBACCO NON-USER: CPT

## 2025-06-10 PROCEDURE — 1160F RVW MEDS BY RX/DR IN RCRD: CPT

## 2025-06-10 PROCEDURE — 1123F ACP DISCUSS/DSCN MKR DOCD: CPT

## 2025-06-10 PROCEDURE — 1159F MED LIST DOCD IN RCRD: CPT

## 2025-06-10 RX ORDER — ALBUTEROL SULFATE 90 UG/1
2 INHALANT RESPIRATORY (INHALATION) EVERY 4 HOURS PRN
Qty: 18 G | Refills: 3 | Status: SHIPPED | OUTPATIENT
Start: 2025-06-10

## 2025-06-10 ASSESSMENT — ENCOUNTER SYMPTOMS
WHEEZING: 0
RHINORRHEA: 0
SHORTNESS OF BREATH: 0
SORE THROAT: 0
CHEST TIGHTNESS: 1
COUGH: 1

## 2025-06-10 NOTE — PROGRESS NOTES
[facility]     Patient: DILLAN CARDENAS, 75 y.o.   : 1950  Date of encounter: 6/10/2025   Previously seen by Dr. Nelli Tovar     Patient presents for 1 year COPD  follow up   Viet presents to the pulmonary clinic alone with no acute concerns or complaints.  He reports no chronic shortness of breath, will occasionally experience some chest tightness when he is walking up hills, does not happen very often.  He reports that sometime he has been told that he wheezes, specifically while at rest or in the evening.  He denies significant cough however does have an occasional morning cough not productive of sputum.  He denies hemoptysis, allergy symptoms or taking any allergy medications.  Reports no inhaler use at this time.  Denies chest pain, chest tightness, shortness of breath at present.  Per chart review Viet is a 76 y/o male non-smoker who follows with the pulmonology office for COPD. Has been tested for A1A in the past with MM genotype.  History of working in a foundry for about 40 years, history of secondhand smoking.    Progress History:   - Since last visit any new medical issues? Cardiac arrythmia- a. fib  - New ER or hospital visits? UC in 2025 for URI  - Any new or changes in medicines? No  - Using inhalers? No  - Are they helpful? N/a     Immunization History   Administered Date(s) Administered    COVID-19, MODERNA BLUE border, Primary or Immunocompromised, (age 12y+), IM, 100 mcg/0.5mL 2021, 2021, 2021    COVID-19, MODERNA Bivalent, (age 12y+), IM, 50 mcg/0.5 mL 2022    COVID-19, MODERNA, , (age 12y+), IM, 50mcg/0.5mL 10/17/2023, 2024    Influenza A (Q2T0-08) Vaccine PF IM 2009    Influenza, AFLURIA (age 3 y+), FLUZONE, (age 6 mo+), Quadv MDV, 0.5mL 10/02/2013, 10/16/2014, 10/15/2016    Influenza, FLUARIX, FLULAVAL, FLUZONE (age 6 mo+) and AFLURIA, (age 3 y+), Quadv PF, 0.5mL 10/26/2015, 10/08/2019

## 2025-06-16 RX ORDER — HYDROCHLOROTHIAZIDE 25 MG/1
12.5 TABLET ORAL DAILY
Qty: 45 TABLET | Refills: 3 | Status: SHIPPED | OUTPATIENT
Start: 2025-06-16

## 2025-07-17 PROCEDURE — 93296 REM INTERROG EVL PM/IDS: CPT | Performed by: NUCLEAR MEDICINE

## 2025-07-17 PROCEDURE — 93294 REM INTERROG EVL PM/LDLS PM: CPT | Performed by: NUCLEAR MEDICINE

## (undated) DEVICE — 1000ML,PRESSURE INFUSER W/STOPCOCK: Brand: MEDLINE

## (undated) DEVICE — SOLUTION IV 1000ML 0.9% SOD CHL PH 5 INJ USP VIAFLX PLAS

## (undated) DEVICE — CYSTO: Brand: MEDLINE INDUSTRIES, INC.

## (undated) DEVICE — SOLUTION IRRIG 1000ML STRL H2O USP PLAS POUR BTL

## (undated) DEVICE — SOLUTION IRRIG 3000ML 0.9% SOD CHL USP UROMATIC PLAS CONT

## (undated) DEVICE — FIBER LASER MOXY 532NM WAVELENGTH LIQUID COOLED SINGLE-USE F/GREENLIGHT XPS SYSTEM

## (undated) DEVICE — CATHETER URETH 22FR 30CC BLLN F 3 W SPEC M RND TIP TWO

## (undated) DEVICE — DRAINBAG,ANTI-REFLUX TOWER,L/F,2000ML,LL: Brand: MEDLINE